# Patient Record
Sex: MALE | Race: WHITE | NOT HISPANIC OR LATINO | Employment: OTHER | URBAN - METROPOLITAN AREA
[De-identification: names, ages, dates, MRNs, and addresses within clinical notes are randomized per-mention and may not be internally consistent; named-entity substitution may affect disease eponyms.]

---

## 2023-07-31 LAB
ALBUMIN (G/DL) IN SER/PLAS: 3.8 G/DL (ref 3.4–5)
ANION GAP IN SER/PLAS: 11 MMOL/L (ref 10–20)
CALCIDIOL (25 OH VITAMIN D3) (NG/ML) IN SER/PLAS: 51 NG/ML
CALCIUM (MG/DL) IN SER/PLAS: 8.3 MG/DL (ref 8.6–10.3)
CARBON DIOXIDE, TOTAL (MMOL/L) IN SER/PLAS: 25 MMOL/L (ref 21–32)
CHLORIDE (MMOL/L) IN SER/PLAS: 108 MMOL/L (ref 98–107)
CREATININE (MG/DL) IN SER/PLAS: 1.51 MG/DL (ref 0.5–1.3)
CREATININE (MG/DL) IN URINE: 69.7 MG/DL (ref 20–370)
ERYTHROCYTE DISTRIBUTION WIDTH (RATIO) BY AUTOMATED COUNT: 13.2 % (ref 11.5–14.5)
ERYTHROCYTE MEAN CORPUSCULAR HEMOGLOBIN CONCENTRATION (G/DL) BY AUTOMATED: 33 G/DL (ref 32–36)
ERYTHROCYTE MEAN CORPUSCULAR VOLUME (FL) BY AUTOMATED COUNT: 91 FL (ref 80–100)
ERYTHROCYTES (10*6/UL) IN BLOOD BY AUTOMATED COUNT: 3.51 X10E12/L (ref 4.5–5.9)
GFR MALE: 42 ML/MIN/1.73M2
GLUCOSE (MG/DL) IN SER/PLAS: 93 MG/DL (ref 74–99)
HEMATOCRIT (%) IN BLOOD BY AUTOMATED COUNT: 32.1 % (ref 41–52)
HEMOGLOBIN (G/DL) IN BLOOD: 10.6 G/DL (ref 13.5–17.5)
LEUKOCYTES (10*3/UL) IN BLOOD BY AUTOMATED COUNT: 3.9 X10E9/L (ref 4.4–11.3)
PHOSPHATE (MG/DL) IN SER/PLAS: 3.3 MG/DL (ref 2.5–4.9)
PLATELETS (10*3/UL) IN BLOOD AUTOMATED COUNT: 100 X10E9/L (ref 150–450)
POTASSIUM (MMOL/L) IN SER/PLAS: 4.8 MMOL/L (ref 3.5–5.3)
PROTEIN (MG/DL) IN URINE: 12 MG/DL (ref 5–25)
PROTEIN/CREATININE (MG/MG) IN URINE: 0.17 MG/MG CREAT (ref 0–0.17)
SODIUM (MMOL/L) IN SER/PLAS: 139 MMOL/L (ref 136–145)
UREA NITROGEN (MG/DL) IN SER/PLAS: 40 MG/DL (ref 6–23)

## 2023-08-01 LAB — PARATHYRIN INTACT (PG/ML) IN SER/PLAS: 41 PG/ML (ref 18.5–88)

## 2024-03-25 ENCOUNTER — LAB (OUTPATIENT)
Dept: LAB | Facility: LAB | Age: 89
End: 2024-03-25
Payer: MEDICARE

## 2024-03-25 DIAGNOSIS — N18.31 CHRONIC KIDNEY DISEASE, STAGE 3A (MULTI): Primary | ICD-10-CM

## 2024-03-25 DIAGNOSIS — N18.31 CHRONIC KIDNEY DISEASE, STAGE 3A (MULTI): ICD-10-CM

## 2024-03-25 LAB
25(OH)D3 SERPL-MCNC: 55 NG/ML (ref 30–100)
ALBUMIN SERPL BCP-MCNC: 4.1 G/DL (ref 3.4–5)
ANION GAP SERPL CALC-SCNC: 10 MMOL/L (ref 10–20)
BUN SERPL-MCNC: 49 MG/DL (ref 6–23)
CALCIUM SERPL-MCNC: 8.4 MG/DL (ref 8.6–10.3)
CHLORIDE SERPL-SCNC: 108 MMOL/L (ref 98–107)
CO2 SERPL-SCNC: 26 MMOL/L (ref 21–32)
CREAT SERPL-MCNC: 1.74 MG/DL (ref 0.5–1.3)
CREAT UR-MCNC: 94.9 MG/DL (ref 20–370)
EGFRCR SERPLBLD CKD-EPI 2021: 35 ML/MIN/1.73M*2
ERYTHROCYTE [DISTWIDTH] IN BLOOD BY AUTOMATED COUNT: 13.1 % (ref 11.5–14.5)
GLUCOSE SERPL-MCNC: 108 MG/DL (ref 74–99)
HCT VFR BLD AUTO: 36.3 % (ref 41–52)
HGB BLD-MCNC: 11.9 G/DL (ref 13.5–17.5)
MCH RBC QN AUTO: 30.7 PG (ref 26–34)
MCHC RBC AUTO-ENTMCNC: 32.8 G/DL (ref 32–36)
MCV RBC AUTO: 94 FL (ref 80–100)
NRBC BLD-RTO: 0 /100 WBCS (ref 0–0)
PHOSPHATE SERPL-MCNC: 3.7 MG/DL (ref 2.5–4.9)
PLATELET # BLD AUTO: 119 X10*3/UL (ref 150–450)
POTASSIUM SERPL-SCNC: 4.9 MMOL/L (ref 3.5–5.3)
PROT UR-ACNC: 10 MG/DL (ref 5–25)
PROT/CREAT UR: 0.11 MG/MG CREAT (ref 0–0.17)
RBC # BLD AUTO: 3.88 X10*6/UL (ref 4.5–5.9)
SODIUM SERPL-SCNC: 139 MMOL/L (ref 136–145)
WBC # BLD AUTO: 4.7 X10*3/UL (ref 4.4–11.3)

## 2024-03-25 PROCEDURE — 80069 RENAL FUNCTION PANEL: CPT

## 2024-03-25 PROCEDURE — 82570 ASSAY OF URINE CREATININE: CPT

## 2024-03-25 PROCEDURE — 84156 ASSAY OF PROTEIN URINE: CPT

## 2024-03-25 PROCEDURE — 82306 VITAMIN D 25 HYDROXY: CPT

## 2024-03-25 PROCEDURE — 36415 COLL VENOUS BLD VENIPUNCTURE: CPT

## 2024-03-25 PROCEDURE — 85027 COMPLETE CBC AUTOMATED: CPT

## 2024-03-25 PROCEDURE — 83970 ASSAY OF PARATHORMONE: CPT

## 2024-03-26 LAB — PTH-INTACT SERPL-MCNC: 60.7 PG/ML (ref 18.5–88)

## 2024-07-09 ENCOUNTER — HOSPITAL ENCOUNTER (EMERGENCY)
Facility: HOSPITAL | Age: 89
Discharge: HOME | End: 2024-07-09
Payer: MEDICARE

## 2024-07-09 ENCOUNTER — APPOINTMENT (OUTPATIENT)
Dept: RADIOLOGY | Facility: HOSPITAL | Age: 89
End: 2024-07-09
Payer: MEDICARE

## 2024-07-09 VITALS
HEART RATE: 68 BPM | BODY MASS INDEX: 29.26 KG/M2 | HEIGHT: 71 IN | DIASTOLIC BLOOD PRESSURE: 60 MMHG | SYSTOLIC BLOOD PRESSURE: 153 MMHG | TEMPERATURE: 97.4 F | RESPIRATION RATE: 16 BRPM | WEIGHT: 209 LBS | OXYGEN SATURATION: 95 %
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
## 2024-07-09 DIAGNOSIS — S30.0XXA CONTUSION OF BUTTOCK, INITIAL ENCOUNTER: ICD-10-CM

## 2024-07-09 DIAGNOSIS — S51.811A SKIN TEAR OF RIGHT FOREARM WITHOUT COMPLICATION, INITIAL ENCOUNTER: ICD-10-CM

## 2024-07-09 PROCEDURE — 73090 X-RAY EXAM OF FOREARM: CPT | Mod: RIGHT SIDE | Performed by: RADIOLOGY

## 2024-07-09 PROCEDURE — 73090 X-RAY EXAM OF FOREARM: CPT | Mod: RT

## 2024-07-09 PROCEDURE — 12004 RPR S/N/AX/GEN/TRK7.6-12.5CM: CPT

## 2024-07-09 PROCEDURE — 90471 IMMUNIZATION ADMIN: CPT | Performed by: NURSE PRACTITIONER

## 2024-07-09 PROCEDURE — 2500000001 HC RX 250 WO HCPCS SELF ADMINISTERED DRUGS (ALT 637 FOR MEDICARE OP): Performed by: NURSE PRACTITIONER

## 2024-07-09 PROCEDURE — 2500000004 HC RX 250 GENERAL PHARMACY W/ HCPCS (ALT 636 FOR OP/ED): Performed by: NURSE PRACTITIONER

## 2024-07-09 PROCEDURE — 72170 X-RAY EXAM OF PELVIS: CPT

## 2024-07-09 PROCEDURE — 2500000005 HC RX 250 GENERAL PHARMACY W/O HCPCS: Performed by: NURSE PRACTITIONER

## 2024-07-09 PROCEDURE — 99284 EMERGENCY DEPT VISIT MOD MDM: CPT | Mod: 25

## 2024-07-09 PROCEDURE — 90715 TDAP VACCINE 7 YRS/> IM: CPT | Performed by: NURSE PRACTITIONER

## 2024-07-09 PROCEDURE — 72170 X-RAY EXAM OF PELVIS: CPT | Performed by: RADIOLOGY

## 2024-07-09 RX ORDER — BACITRACIN ZINC 500 UNIT/G
OINTMENT IN PACKET (EA) TOPICAL ONCE
Status: COMPLETED | OUTPATIENT
Start: 2024-07-09 | End: 2024-07-09

## 2024-07-09 RX ORDER — ACETAMINOPHEN 325 MG/1
975 TABLET ORAL ONCE
Status: COMPLETED | OUTPATIENT
Start: 2024-07-09 | End: 2024-07-09

## 2024-07-09 RX ORDER — CEPHALEXIN 500 MG/1
500 CAPSULE ORAL 3 TIMES DAILY
Qty: 21 CAPSULE | Refills: 0 | Status: SHIPPED | OUTPATIENT
Start: 2024-07-09 | End: 2024-07-16

## 2024-07-09 RX ORDER — LIDOCAINE HYDROCHLORIDE AND EPINEPHRINE 10; 10 MG/ML; UG/ML
5 INJECTION, SOLUTION INFILTRATION; PERINEURAL ONCE
Status: COMPLETED | OUTPATIENT
Start: 2024-07-09 | End: 2024-07-09

## 2024-07-09 ASSESSMENT — PAIN - FUNCTIONAL ASSESSMENT: PAIN_FUNCTIONAL_ASSESSMENT: 0-10

## 2024-07-09 ASSESSMENT — COLUMBIA-SUICIDE SEVERITY RATING SCALE - C-SSRS
1. IN THE PAST MONTH, HAVE YOU WISHED YOU WERE DEAD OR WISHED YOU COULD GO TO SLEEP AND NOT WAKE UP?: NO
6. HAVE YOU EVER DONE ANYTHING, STARTED TO DO ANYTHING, OR PREPARED TO DO ANYTHING TO END YOUR LIFE?: NO
2. HAVE YOU ACTUALLY HAD ANY THOUGHTS OF KILLING YOURSELF?: NO

## 2024-07-09 ASSESSMENT — PAIN DESCRIPTION - ORIENTATION: ORIENTATION: RIGHT;LOWER

## 2024-07-09 ASSESSMENT — PAIN DESCRIPTION - DESCRIPTORS: DESCRIPTORS: ACHING

## 2024-07-09 ASSESSMENT — LIFESTYLE VARIABLES
EVER FELT BAD OR GUILTY ABOUT YOUR DRINKING: NO
HAVE PEOPLE ANNOYED YOU BY CRITICIZING YOUR DRINKING: NO
HAVE YOU EVER FELT YOU SHOULD CUT DOWN ON YOUR DRINKING: NO
EVER HAD A DRINK FIRST THING IN THE MORNING TO STEADY YOUR NERVES TO GET RID OF A HANGOVER: NO
TOTAL SCORE: 0

## 2024-07-09 ASSESSMENT — PAIN DESCRIPTION - FREQUENCY: FREQUENCY: CONSTANT/CONTINUOUS

## 2024-07-09 ASSESSMENT — PAIN DESCRIPTION - LOCATION: LOCATION: ARM

## 2024-07-09 ASSESSMENT — PAIN SCALES - GENERAL: PAINLEVEL_OUTOF10: 3

## 2024-07-09 ASSESSMENT — PAIN DESCRIPTION - PAIN TYPE: TYPE: ACUTE PAIN

## 2024-07-10 NOTE — DISCHARGE INSTRUCTIONS
After 24 hours, take off the dressing and begin twice daily wound care. Clean twice daily with antibacterial soap and water. Pat dry. Use a small smear of antibiotic ointment Neosporin/Bacitracin or over the counter triple antibiotic cream. Should the wound start to look infected (red, swollen, painful, drainage), take the prescription for antibiotic pills to the pharmacy and start taking the medication. Then call your doctor or clinic as listed for a wound recheck.

## 2024-07-10 NOTE — ED PROVIDER NOTES
HPI   Chief Complaint   Patient presents with    Laceration     Fall->R forearm  skin tear       Patient presents to the emergency department in the care of his wife for evaluation of a right forearm injury that occurred prior to arrival.  Patient and his wife were walking into [employer] for dinner when his wife saw his foot catch on even ground causing him to stumble into a metal bar and then land onto his buttocks.  He remained alert and oriented for the entire incident.  She witnessed the entire incident as well and both confirm there was no head injury, loss of consciousness and there is no anticoagulant use.  Patient denies headache, neck pain, back pain, chest pain, shortness of breath, nausea, vomiting, abdominal pain or other extremity injury associated with his fall.  He came to the emergency department because of a large skin tear on his right forearm and he believes he needs a tetanus vaccine.  He notices some discomfort when he sits on his wallet as he drove he and his wife here to the emergency department.  There is been no loss of bowel or bladder control nor is there any numbness, weakness or inability to ambulate.  He has not noticed any limited range of motion of his fingers secondary to his injury and he is right-hand dominant.  He has not taken any Tylenol or ibuprofen for his discomfort prior to arrival.  His symptoms are aggravated by movement.  He denies any symptoms prior to his fall such as recent fever, URI, palpitations, dizziness, chest pain or shortness of breath.      History provided by:  Patient and spouse   used: No                        [place] Coma Scale Score: 15                     Patient History   Past Medical History:   Diagnosis Date    Hypertension      Past Surgical History:   Procedure Laterality Date    OTHER SURGICAL HISTORY  06/30/2020    Hernia repair    OTHER SURGICAL HISTORY  06/30/2020    Appendectomy     No family history on file.  Social History      Tobacco Use    Smoking status: Not on file    Smokeless tobacco: Not on file   Substance Use Topics    Alcohol use: Not on file    Drug use: Not on file       Physical Exam   ED Triage Vitals [07/09/24 1843]   Temperature Heart Rate Respirations BP   36.3 °C (97.4 °F) 68 16 153/60      Pulse Ox Temp Source Heart Rate Source Patient Position   95 % Temporal -- --      BP Location FiO2 (%)     -- --       Physical Exam  Vitals and nursing note reviewed.   Constitutional:       General: He is not in acute distress.     Appearance: He is not toxic-appearing.   HENT:      Head: Normocephalic and atraumatic. No raccoon eyes or Zuleta's sign.      Comments: No outward sign of trauma.     Right Ear: Decreased hearing noted.      Left Ear: Decreased hearing noted.      Nose: Nose normal.      Mouth/Throat:      Lips: Pink.      Mouth: Mucous membranes are moist.   Eyes:      Conjunctiva/sclera: Conjunctivae normal.      Pupils: Pupils are equal, round, and reactive to light.   Neck:      Vascular: No JVD.   Cardiovascular:      Rate and Rhythm: Normal rate and regular rhythm.      Pulses:           Radial pulses are 2+ on the right side.   Pulmonary:      Effort: Pulmonary effort is normal.      Breath sounds: Normal breath sounds. No wheezing or rhonchi.   Chest:      Chest wall: No tenderness or crepitus.      Comments: No outward sign of trauma  Abdominal:      Palpations: Abdomen is soft.      Tenderness: There is no abdominal tenderness.   Musculoskeletal:         General: No swelling.      Cervical back: Full passive range of motion without pain and neck supple. No spinous process tenderness.      Right lower leg: Edema present.      Left lower leg: Edema present.      Comments: NIA corral.  There is no bony tenderness to the cervical, thoracic or lumbar spine.  No pain upon palpation of the pelvis.  There is some discomfort to palpation of the left ischium.  No pain upon palpation of the bilateral lower  extremities and patient is able to ambulate with out an assistive device.  There is no bony tenderness to palpation of the left upper extremity.  There is soft tissue tenderness to the right forearm with a large skin tear with bleeding controlled and no heavy debris.  No obvious tendon injury.  Full range of motion and no bony tenderness at the elbow and wrist. Strong right radial and ulnar pulses.  Normal capillary refill.  Full sensation and motor movement intact along the radial, median and ulnar nerve distribution. Neurovascularly intact with compartments soft.    Skin:     General: Skin is warm and dry.      Capillary Refill: Capillary refill takes less than 2 seconds.      Findings: Wound (skin tear right forearm) present.   Neurological:      General: No focal deficit present.      Mental Status: He is alert and oriented to person, place, and time.      Gait: Gait is intact.      Comments: Clear speech.  No facial asymmetry.  Hand grasps, pushes, pulls, dorsiflexion and plantarflexion strong and equal bilaterally.   Psychiatric:         Mood and Affect: Mood normal.         Behavior: Behavior is cooperative.         ED Course & MDM   ED Course as of 07/09/24 2313   Tue Jul 09, 2024 2117 X-rays discussed. Plan is for wound care twice daily as discussed, topical antibiotic cream, watchful waiting for starting cephalexin as discussed otherwise outpatient follow-up for suture removal in 7 days with primary care. Patient is non-toxic, not hypoxic and appropriate for this outpatient management plan which they prefer. Encouragement to arrange close follow up was discussed as well as provided in a written handout of discharge instructions. Patient was educated on signs of symptoms to watch for indicative of re-evaluation in the emergency department setting to include any worsening of current symptoms. They verbalized understanding of instructions and is amenable to this treatment plan with no social determinants of  health that would obscure this plan. Patient departed in stable condition.  [NA]      ED Course User Index  [NA] RADHA Mishra         Diagnoses as of 07/09/24 2313   Fall, initial encounter   Skin tear of right forearm without complication, initial encounter   Contusion of buttock, initial encounter       Medical Decision Making  Patient presents for evaluation of a wound. Tetanus vaccination is not up to date. Bleeding is controlled and patient is not on anticoagulation. There is clinical evidence warranting diagnostic imaging.  Plan is for x-ray imaging, Tylenol, tetanus booster and wound repair as documented below.  Patient provides consent for this management plan.        Procedure  TIME: 2045    LACERATION REPAIR - SUTURES    Indication: large skin tear with good approximation  Performed By: Brielle Salmeron CNP  Risks, benefits and alternatives for the applicable procedure described. Opportunity for questions and answers provided to allow for informed decision making.  Consent: Verbal consent was obtained by the patient    LOCATION: dorsal ulnar aspect right mid forearm  LENGTH: 8.5 cm  THICKNESS: Partial     Anesthesia was obtained by direct infiltration with 1% lidocaine with epinephrine. The laceration was treated with irrigation with saline solution under pressure. The area was prepped and draped in the usual sterile fashion. The wound was explored with No foreign bodies found, No tendon laceration seen. The site was then repaired with  16 , 5-0, Ethilon interrupted skin closure sutures with good approximation. No debridement or undermining was required. A bacitracin dressing was placed over the site. Patient was neurovascularly intact before and after the procedure. They tolerated the procedure well without complications. Wound care, dressing changes, suture removal and return precautions discussed with understanding verbalized.       RADHA Mishra  07/09/24 2313

## 2024-09-06 ENCOUNTER — HOSPITAL ENCOUNTER (EMERGENCY)
Facility: HOSPITAL | Age: 89
Discharge: HOME | End: 2024-09-06
Payer: MEDICARE

## 2024-09-06 VITALS
SYSTOLIC BLOOD PRESSURE: 108 MMHG | HEART RATE: 86 BPM | WEIGHT: 203 LBS | HEIGHT: 71 IN | OXYGEN SATURATION: 94 % | TEMPERATURE: 97.9 F | BODY MASS INDEX: 28.42 KG/M2 | DIASTOLIC BLOOD PRESSURE: 48 MMHG | RESPIRATION RATE: 16 BRPM

## 2024-09-06 DIAGNOSIS — L03.115 CELLULITIS OF RIGHT LOWER EXTREMITY: Primary | ICD-10-CM

## 2024-09-06 PROCEDURE — 99283 EMERGENCY DEPT VISIT LOW MDM: CPT

## 2024-09-06 PROCEDURE — 2500000001 HC RX 250 WO HCPCS SELF ADMINISTERED DRUGS (ALT 637 FOR MEDICARE OP)

## 2024-09-06 RX ORDER — CEPHALEXIN 500 MG/1
500 CAPSULE ORAL 4 TIMES DAILY
Qty: 40 CAPSULE | Refills: 0 | Status: SHIPPED | OUTPATIENT
Start: 2024-09-06 | End: 2024-09-16

## 2024-09-06 RX ORDER — MUPIROCIN 20 MG/G
1 OINTMENT TOPICAL
Qty: 30 G | Refills: 1 | Status: SHIPPED | OUTPATIENT
Start: 2024-09-06

## 2024-09-06 RX ORDER — MUPIROCIN 20 MG/G
OINTMENT TOPICAL ONCE
Status: COMPLETED | OUTPATIENT
Start: 2024-09-06 | End: 2024-09-06

## 2024-09-06 RX ORDER — CEPHALEXIN 250 MG/1
500 CAPSULE ORAL ONCE
Status: COMPLETED | OUTPATIENT
Start: 2024-09-06 | End: 2024-09-06

## 2024-09-06 ASSESSMENT — PAIN - FUNCTIONAL ASSESSMENT: PAIN_FUNCTIONAL_ASSESSMENT: 0-10

## 2024-09-06 ASSESSMENT — PAIN SCALES - GENERAL: PAINLEVEL_OUTOF10: 4

## 2024-09-06 ASSESSMENT — COLUMBIA-SUICIDE SEVERITY RATING SCALE - C-SSRS
1. IN THE PAST MONTH, HAVE YOU WISHED YOU WERE DEAD OR WISHED YOU COULD GO TO SLEEP AND NOT WAKE UP?: NO
2. HAVE YOU ACTUALLY HAD ANY THOUGHTS OF KILLING YOURSELF?: NO
6. HAVE YOU EVER DONE ANYTHING, STARTED TO DO ANYTHING, OR PREPARED TO DO ANYTHING TO END YOUR LIFE?: NO

## 2024-09-06 ASSESSMENT — PAIN DESCRIPTION - ORIENTATION: ORIENTATION: RIGHT

## 2024-09-06 NOTE — ED PROVIDER NOTES
Chief Complaint   Patient presents with    Wound Infection     Right leg behind knee wound with bleeding and drainage        89-year-old male arrives to the emergency department with a chief complaint of skin breakdown, redness, swelling, warmth on the right popliteal area.  The patient recently had a cellulitic infection on the left cheek, approximately 3 weeks ago was placed on oral antibiotic therapy, the skin infection on the face has subsided, and the patient noticed a small spot on the back of his right knee.  This spot has grown, on initial assessment there is a circular area of skin breakdown approximately 6 cm in diameter, with the edges slightly erythemic, no significant warmth or redness appreciated.  Patient denies fevers or chills, patient hemodynamically stable upon arrival.  Patient is high functioning for age, ambulates with assistance at baseline, and states that he feels well outside of the symptoms on the right posterior leg.  Patient arrives with the wound dressed, and clean.  Patient denies any other symptoms or complaints           PmHx, PsHx, Allergies, Family Hx, social Hx reviewed as documented    A complete 10 point review of systems was performed and is negative except for as mentioned in the HPI.    Physical Exam:    General: Patient is AAOx3, appears well developed, well nourished, is a good historian, answers questions appropriately    HEENT: head normocephalic, atraumatic, PERRLA, EOMs intact, oropharynx without erythema or exudate, buccal mucosa intact without lesions, TMs unremarkable, nose is patent bilateral    Neck: supple, full ROM, negative for lymphadenopathy, JVD, thyromegaly, tracheal deviation, nuccal rigidity    Pulmonary: CTAB, no accessory muscle use, able to speak full clear sentences    Cardiac: HRRR, no murmurs, rubs or gallops    GI: soft, non-tender, non-distended, BS + x 4, no masses or organomegaly, no guarding or CVA tenderness noted, negative ortiz's,  bakariey's    Musculoskeletal: full weight bearing, KRAMER, no joint effusions, clubbing or edema noted    Skin: Area of skin breakdown in the right popliteal per HPI, please see attached photo.  Patient has multiple areas of what appear to be chronic skin breakdown likely secondary to eczema    Neuro: patient follow commands, cranial nerves 2-12 grossly intact, motor strengths 5/5 upper and lower extremities, DTR's and sensation are symmetrical. No focal deficits.    Rectal/: No urinary burning, urgency, change in frequency.  Patient has no rectal complaints            Medical Decision Making  This patient was seen in the emergency department with an attending physician available at all times throughout their ED course    Primary consideration for this patient would be treatment of the cellulitic infection, however the infectious process does seem to be mild, with the skin breakdown appearing chronic with no systemic component appreciated.  Through shared decision making with the patient as well as family at bedside, no diagnostic blood work will be done at this time given the patient does feel at his baseline.  There is no purulent drainage, redness, warmth, or signs of severe infection at the area.  Patient does have a wound care appointment established for 9/10.    The primary nurse cleansed the area with Dermaclens, applied the ordered Bactroban, covered with a nonadherent dressing and wrapped in Kerlix.  The patient as well as the family members verbalized understanding of wound care at home.  Patient also started on 500 mg of Keflex 4 times a day for 10 days given his first dose here in the emergency department.  Patient also given an outpatient prescription of Bactroban to be used and lieu of Neosporin.    Patient is amenable to the plan of discharge as outlined above, all patient's questions pertaining to their ED course were answered in their entirety.  Strict return precautions were discussed with the  "patient and they verbalized understanding.  Further, it was made clear to the patient that from an emergent basis, all effort and testing was done to eliminate any imminent dangerous or potentially dangerous conditions of the patient however if their symptoms get much worse or feel life-threatening, they are to return to the emergency department or call 911 immediately.       Diagnoses as of 09/06/24 2019   Cellulitis of right lower extremity       The patient has had the following imaging during this ER visit: NURSING COMMUNICATION - DO NOT USE IN ORDER SETS     Patient History   Past Medical History:   Diagnosis Date    Hypertension      Past Surgical History:   Procedure Laterality Date    OTHER SURGICAL HISTORY  06/30/2020    Hernia repair    OTHER SURGICAL HISTORY  06/30/2020    Appendectomy     No family history on file.  Social History     Tobacco Use    Smoking status: Not on file    Smokeless tobacco: Not on file   Substance Use Topics    Alcohol use: Not on file    Drug use: Not on file       ED Triage Vitals [09/06/24 1307]   Temperature Heart Rate Respirations BP   36.6 °C (97.9 °F) 86 16 (!) 108/48      Pulse Ox Temp Source Heart Rate Source Patient Position   94 % Temporal -- Sitting      BP Location FiO2 (%)     Left arm --       Vitals:    09/06/24 1307   BP: (!) 108/48   BP Location: Left arm   Patient Position: Sitting   Pulse: 86   Resp: 16   Temp: 36.6 °C (97.9 °F)   TempSrc: Temporal   SpO2: 94%   Weight: 92.1 kg (203 lb)   Height: 1.803 m (5' 11\")               SYLVIE Dalal-CNP  09/06/24 2020    "

## 2024-09-06 NOTE — DISCHARGE INSTRUCTIONS
As discussed, please use Telfa/nonadherent gauze pads to cover the area and rolled gauze such as Kerlix and lieu of tape to prevent further skin breakdown.  Please maintain appointment with wound care and ensure that there is a weekly wound care regimen in place until wound is completely healed

## 2025-01-20 ENCOUNTER — HOSPITAL ENCOUNTER (EMERGENCY)
Facility: HOSPITAL | Age: OVER 89
Discharge: HOME | End: 2025-01-20
Payer: MEDICARE

## 2025-01-20 VITALS
OXYGEN SATURATION: 97 % | WEIGHT: 204 LBS | HEIGHT: 71 IN | DIASTOLIC BLOOD PRESSURE: 63 MMHG | TEMPERATURE: 97 F | SYSTOLIC BLOOD PRESSURE: 186 MMHG | BODY MASS INDEX: 28.56 KG/M2 | RESPIRATION RATE: 16 BRPM | HEART RATE: 69 BPM

## 2025-01-20 DIAGNOSIS — S51.811A SKIN TEAR OF FOREARM WITHOUT COMPLICATION, RIGHT, INITIAL ENCOUNTER: Primary | ICD-10-CM

## 2025-01-20 PROCEDURE — 99282 EMERGENCY DEPT VISIT SF MDM: CPT

## 2025-01-20 PROCEDURE — 99281 EMR DPT VST MAYX REQ PHY/QHP: CPT

## 2025-01-20 ASSESSMENT — PAIN SCALES - GENERAL: PAINLEVEL_OUTOF10: 4

## 2025-01-20 ASSESSMENT — LIFESTYLE VARIABLES
EVER HAD A DRINK FIRST THING IN THE MORNING TO STEADY YOUR NERVES TO GET RID OF A HANGOVER: NO
TOTAL SCORE: 0
HAVE PEOPLE ANNOYED YOU BY CRITICIZING YOUR DRINKING: NO
HAVE YOU EVER FELT YOU SHOULD CUT DOWN ON YOUR DRINKING: NO
EVER FELT BAD OR GUILTY ABOUT YOUR DRINKING: NO

## 2025-01-20 ASSESSMENT — PAIN DESCRIPTION - ORIENTATION: ORIENTATION: RIGHT

## 2025-01-20 ASSESSMENT — PAIN DESCRIPTION - LOCATION: LOCATION: ARM

## 2025-01-20 ASSESSMENT — PAIN - FUNCTIONAL ASSESSMENT: PAIN_FUNCTIONAL_ASSESSMENT: 0-10

## 2025-01-20 NOTE — ED PROVIDER NOTES
HPI   Chief Complaint   Patient presents with    Laceration     Woke up with a small lac to r arm not sure how       This is a 89-year-old  male presenting to the emergency room with complaints of a left forearm wound.  The patient rolled over in bed this morning and noted that his arm was bleeding.  The patient has a skin tear to the forearm.  States that it happens frequently due to the thinness of his skin.  Patient is uncertain if his tetanus is up-to-date.  He is experiencing 4 out of 10 pain to the extremity.  Denies any loss of motor function or sensation to the extremity.  He is right-hand dominant.      History provided by:  Patient   used: No            Patient History   Past Medical History:   Diagnosis Date    Hypertension      Past Surgical History:   Procedure Laterality Date    OTHER SURGICAL HISTORY  06/30/2020    Hernia repair    OTHER SURGICAL HISTORY  06/30/2020    Appendectomy     No family history on file.  Social History     Tobacco Use    Smoking status: Not on file    Smokeless tobacco: Not on file   Substance Use Topics    Alcohol use: Not on file    Drug use: Not on file       Physical Exam   ED Triage Vitals [01/20/25 0817]   Temperature Heart Rate Respirations BP   36.1 °C (97 °F) 69 16 (!) 186/63      Pulse Ox Temp src Heart Rate Source Patient Position   97 % -- -- --      BP Location FiO2 (%)     -- --       Physical Exam  Vitals and nursing note reviewed.   HENT:      Head: Normocephalic and atraumatic.   Cardiovascular:      Rate and Rhythm: Normal rate and regular rhythm.      Pulses: Normal pulses.      Heart sounds: Normal heart sounds.   Pulmonary:      Effort: Pulmonary effort is normal.      Breath sounds: Normal breath sounds.   Musculoskeletal:         General: Normal range of motion.   Skin:     General: Skin is warm.      Capillary Refill: Capillary refill takes less than 2 seconds.      Comments: Patient has a 2 cm in length skin tear noted to  the dorsal aspect of the right forearm.  No active bleeding.  No obvious foreign bodies.  No bony involvement.   Neurological:      General: No focal deficit present.      Mental Status: He is alert.   Psychiatric:         Mood and Affect: Mood normal.           ED Course & MDM   Diagnoses as of 01/20/25 0849   Skin tear of forearm without complication, right, initial encounter                 No data recorded     [place] Coma Scale Score: 15 (01/20/25 0815 : Pau Good RN)                           Medical Decision Making  The patient was seen and evaluated by the nurse practitioner, Divina Douglas.  The patient is presenting to the emergency room with complaints of a skin tear to the right forearm.  The wound was examined.  There is very superficial in nature.  A suture repair is not warranted.  The patient was covered with sterile drape.  The wound was cleaned with Shur-Clens and irrigated with sterile saline.  The wound base was visualized.  No bony involvement.  No tendon injury noted full range of motion of the extremity.  The wound edges were approximated and secured with multiple Steri-Strips.  Nonadherent dressing was applied.  The patient's tetanus was updated last year.  He was discharged in stable condition with computer discharge instructions given.  The patient was educated on wound care and signs and symptoms of infection.  He is to follow-up with his primary care physician in the next 2 to 3 days.        Procedure  Procedures     SYLVIE Montoya-CNP  01/20/25 0854

## 2025-02-12 ENCOUNTER — APPOINTMENT (OUTPATIENT)
Dept: RADIOLOGY | Facility: HOSPITAL | Age: OVER 89
DRG: 185 | End: 2025-02-12
Payer: MEDICARE

## 2025-02-12 ENCOUNTER — APPOINTMENT (OUTPATIENT)
Dept: CARDIOLOGY | Facility: HOSPITAL | Age: OVER 89
DRG: 185 | End: 2025-02-12
Payer: MEDICARE

## 2025-02-12 ENCOUNTER — HOSPITAL ENCOUNTER (INPATIENT)
Facility: HOSPITAL | Age: OVER 89
LOS: 3 days | Discharge: NURSING, REHAB OR LONG-TERM CARE | DRG: 185 | End: 2025-02-15
Attending: EMERGENCY MEDICINE | Admitting: INTERNAL MEDICINE
Payer: MEDICARE
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
## 2025-02-12 DIAGNOSIS — S22.41XA MULTIPLE FRACTURES OF RIBS, RIGHT SIDE, INITIAL ENCOUNTER FOR CLOSED FRACTURE: ICD-10-CM

## 2025-02-12 PROBLEM — R00.1 BRADYCARDIA: Status: ACTIVE | Noted: 2023-01-30

## 2025-02-12 PROBLEM — E53.8 VITAMIN B12 DEFICIENCY: Status: ACTIVE | Noted: 2025-02-12

## 2025-02-12 PROBLEM — I12.9 HYPERTENSIVE KIDNEY DISEASE WITH STAGE 3B CHRONIC KIDNEY DISEASE (MULTI): Status: ACTIVE | Noted: 2023-02-07

## 2025-02-12 PROBLEM — H91.93 DECREASED HEARING OF BOTH EARS: Status: ACTIVE | Noted: 2025-02-12

## 2025-02-12 PROBLEM — I10 ESSENTIAL HYPERTENSION: Status: ACTIVE | Noted: 2021-01-05

## 2025-02-12 PROBLEM — G89.29 CHRONIC PAIN OF RIGHT KNEE: Status: ACTIVE | Noted: 2022-07-08

## 2025-02-12 PROBLEM — E55.9 VITAMIN D DEFICIENCY: Status: ACTIVE | Noted: 2022-10-24

## 2025-02-12 PROBLEM — N18.32 HYPERTENSIVE KIDNEY DISEASE WITH STAGE 3B CHRONIC KIDNEY DISEASE (MULTI): Status: ACTIVE | Noted: 2023-02-07

## 2025-02-12 PROBLEM — E78.2 MIXED HYPERLIPIDEMIA: Status: ACTIVE | Noted: 2025-01-07

## 2025-02-12 PROBLEM — H25.091 AGE-RELATED INCIPIENT CATARACT OF RIGHT EYE: Status: ACTIVE | Noted: 2025-02-12

## 2025-02-12 PROBLEM — I49.1 PREMATURE ATRIAL CONTRACTIONS: Status: ACTIVE | Noted: 2025-02-12

## 2025-02-12 PROBLEM — N18.32 STAGE 3B CHRONIC KIDNEY DISEASE (MULTI): Status: ACTIVE | Noted: 2022-01-08

## 2025-02-12 PROBLEM — F33.41 RECURRENT MAJOR DEPRESSIVE DISORDER, IN PARTIAL REMISSION (CMS-HCC): Status: ACTIVE | Noted: 2025-01-07

## 2025-02-12 PROBLEM — G47.33 OBSTRUCTIVE SLEEP APNEA: Status: ACTIVE | Noted: 2021-07-06

## 2025-02-12 PROBLEM — M79.89 BILATERAL SWELLING OF FEET: Status: ACTIVE | Noted: 2021-07-06

## 2025-02-12 PROBLEM — M25.561 CHRONIC PAIN OF RIGHT KNEE: Status: ACTIVE | Noted: 2022-07-08

## 2025-02-12 PROBLEM — M17.11 LOCALIZED OSTEOARTHRITIS OF RIGHT KNEE: Status: ACTIVE | Noted: 2025-01-07

## 2025-02-12 PROBLEM — R73.03 PREDIABETES: Status: ACTIVE | Noted: 2022-10-24

## 2025-02-12 PROBLEM — I45.10 RIGHT BUNDLE BRANCH BLOCK (RBBB) ON ELECTROCARDIOGRAPHY: Status: ACTIVE | Noted: 2025-02-12

## 2025-02-12 PROBLEM — D69.6 THROMBOCYTOPENIA (CMS-HCC): Status: ACTIVE | Noted: 2022-01-08

## 2025-02-12 LAB
ABO GROUP (TYPE) IN BLOOD: NORMAL
ALBUMIN SERPL BCP-MCNC: 3.6 G/DL (ref 3.4–5)
ALP SERPL-CCNC: 67 U/L (ref 33–136)
ALT SERPL W P-5'-P-CCNC: 11 U/L (ref 10–52)
ANION GAP SERPL CALC-SCNC: 10 MMOL/L (ref 10–20)
ANTIBODY SCREEN: NORMAL
APTT PPP: 27 SECONDS (ref 27–38)
AST SERPL W P-5'-P-CCNC: 14 U/L (ref 9–39)
BASOPHILS # BLD AUTO: 0.02 X10*3/UL (ref 0–0.1)
BASOPHILS NFR BLD AUTO: 0.5 %
BILIRUB SERPL-MCNC: 0.8 MG/DL (ref 0–1.2)
BUN SERPL-MCNC: 36 MG/DL (ref 6–23)
CALCIUM SERPL-MCNC: 8.5 MG/DL (ref 8.6–10.3)
CHLORIDE SERPL-SCNC: 108 MMOL/L (ref 98–107)
CO2 SERPL-SCNC: 23 MMOL/L (ref 21–32)
CREAT SERPL-MCNC: 1.73 MG/DL (ref 0.5–1.3)
EGFRCR SERPLBLD CKD-EPI 2021: 35 ML/MIN/1.73M*2
EOSINOPHIL # BLD AUTO: 0 X10*3/UL (ref 0–0.4)
EOSINOPHIL NFR BLD AUTO: 0 %
ERYTHROCYTE [DISTWIDTH] IN BLOOD BY AUTOMATED COUNT: 14.2 % (ref 11.5–14.5)
FLUAV RNA RESP QL NAA+PROBE: NOT DETECTED
FLUBV RNA RESP QL NAA+PROBE: NOT DETECTED
GLUCOSE SERPL-MCNC: 121 MG/DL (ref 74–99)
HCT VFR BLD AUTO: 30.1 % (ref 41–52)
HGB BLD-MCNC: 10.1 G/DL (ref 13.5–17.5)
HYPOCHROMIA BLD QL SMEAR: NORMAL
IMM GRANULOCYTES # BLD AUTO: 0.02 X10*3/UL (ref 0–0.5)
IMM GRANULOCYTES NFR BLD AUTO: 0.5 % (ref 0–0.9)
INR PPP: 1.2 (ref 0.9–1.1)
LYMPHOCYTES # BLD AUTO: 0.73 X10*3/UL (ref 0.8–3)
LYMPHOCYTES NFR BLD AUTO: 16.9 %
MCH RBC QN AUTO: 29.9 PG (ref 26–34)
MCHC RBC AUTO-ENTMCNC: 33.6 G/DL (ref 32–36)
MCV RBC AUTO: 89 FL (ref 80–100)
MONOCYTES # BLD AUTO: 0.48 X10*3/UL (ref 0.05–0.8)
MONOCYTES NFR BLD AUTO: 11.1 %
NEUTROPHILS # BLD AUTO: 3.08 X10*3/UL (ref 1.6–5.5)
NEUTROPHILS NFR BLD AUTO: 71 %
NRBC BLD-RTO: 0 /100 WBCS (ref 0–0)
OVALOCYTES BLD QL SMEAR: NORMAL
PLATELET # BLD AUTO: 92 X10*3/UL (ref 150–450)
POTASSIUM SERPL-SCNC: 4.1 MMOL/L (ref 3.5–5.3)
PROT SERPL-MCNC: 6.4 G/DL (ref 6.4–8.2)
PROTHROMBIN TIME: 13.2 SECONDS (ref 9.8–12.8)
RBC # BLD AUTO: 3.38 X10*6/UL (ref 4.5–5.9)
RBC MORPH BLD: NORMAL
RH FACTOR (ANTIGEN D): NORMAL
SARS-COV-2 RNA RESP QL NAA+PROBE: NOT DETECTED
SODIUM SERPL-SCNC: 137 MMOL/L (ref 136–145)
WBC # BLD AUTO: 4.3 X10*3/UL (ref 4.4–11.3)

## 2025-02-12 PROCEDURE — 2500000005 HC RX 250 GENERAL PHARMACY W/O HCPCS: Performed by: EMERGENCY MEDICINE

## 2025-02-12 PROCEDURE — 99223 1ST HOSP IP/OBS HIGH 75: CPT

## 2025-02-12 PROCEDURE — 86901 BLOOD TYPING SEROLOGIC RH(D): CPT | Performed by: EMERGENCY MEDICINE

## 2025-02-12 PROCEDURE — 2500000004 HC RX 250 GENERAL PHARMACY W/ HCPCS (ALT 636 FOR OP/ED)

## 2025-02-12 PROCEDURE — 2060000001 HC INTERMEDIATE ICU ROOM DAILY

## 2025-02-12 PROCEDURE — 74176 CT ABD & PELVIS W/O CONTRAST: CPT | Mod: RSC | Performed by: RADIOLOGY

## 2025-02-12 PROCEDURE — 72131 CT LUMBAR SPINE W/O DYE: CPT | Mod: RSC | Performed by: RADIOLOGY

## 2025-02-12 PROCEDURE — 36415 COLL VENOUS BLD VENIPUNCTURE: CPT | Performed by: EMERGENCY MEDICINE

## 2025-02-12 PROCEDURE — 2500000001 HC RX 250 WO HCPCS SELF ADMINISTERED DRUGS (ALT 637 FOR MEDICARE OP): Performed by: EMERGENCY MEDICINE

## 2025-02-12 PROCEDURE — 96372 THER/PROPH/DIAG INJ SC/IM: CPT

## 2025-02-12 PROCEDURE — 96374 THER/PROPH/DIAG INJ IV PUSH: CPT | Mod: 59

## 2025-02-12 PROCEDURE — 2500000004 HC RX 250 GENERAL PHARMACY W/ HCPCS (ALT 636 FOR OP/ED): Performed by: EMERGENCY MEDICINE

## 2025-02-12 PROCEDURE — 99222 1ST HOSP IP/OBS MODERATE 55: CPT | Performed by: SURGERY

## 2025-02-12 PROCEDURE — 99285 EMERGENCY DEPT VISIT HI MDM: CPT | Mod: 25 | Performed by: EMERGENCY MEDICINE

## 2025-02-12 PROCEDURE — 71250 CT THORAX DX C-: CPT | Mod: RSC | Performed by: RADIOLOGY

## 2025-02-12 PROCEDURE — 93005 ELECTROCARDIOGRAM TRACING: CPT

## 2025-02-12 PROCEDURE — 72131 CT LUMBAR SPINE W/O DYE: CPT | Mod: RSC

## 2025-02-12 PROCEDURE — 72128 CT CHEST SPINE W/O DYE: CPT | Mod: RSC

## 2025-02-12 PROCEDURE — 2500000001 HC RX 250 WO HCPCS SELF ADMINISTERED DRUGS (ALT 637 FOR MEDICARE OP)

## 2025-02-12 PROCEDURE — G0390 TRAUMA RESPONS W/HOSP CRITI: HCPCS

## 2025-02-12 PROCEDURE — 71250 CT THORAX DX C-: CPT

## 2025-02-12 PROCEDURE — 85025 COMPLETE CBC W/AUTO DIFF WBC: CPT | Performed by: EMERGENCY MEDICINE

## 2025-02-12 PROCEDURE — 2500000001 HC RX 250 WO HCPCS SELF ADMINISTERED DRUGS (ALT 637 FOR MEDICARE OP): Performed by: SURGERY

## 2025-02-12 PROCEDURE — 80053 COMPREHEN METABOLIC PANEL: CPT | Performed by: EMERGENCY MEDICINE

## 2025-02-12 PROCEDURE — 96375 TX/PRO/DX INJ NEW DRUG ADDON: CPT

## 2025-02-12 PROCEDURE — 2500000002 HC RX 250 W HCPCS SELF ADMINISTERED DRUGS (ALT 637 FOR MEDICARE OP, ALT 636 FOR OP/ED)

## 2025-02-12 PROCEDURE — 72128 CT CHEST SPINE W/O DYE: CPT | Mod: RSC | Performed by: RADIOLOGY

## 2025-02-12 PROCEDURE — 85610 PROTHROMBIN TIME: CPT | Performed by: EMERGENCY MEDICINE

## 2025-02-12 PROCEDURE — 87636 SARSCOV2 & INF A&B AMP PRB: CPT | Performed by: EMERGENCY MEDICINE

## 2025-02-12 RX ORDER — ONDANSETRON HYDROCHLORIDE 2 MG/ML
4 INJECTION, SOLUTION INTRAVENOUS EVERY 6 HOURS PRN
Status: DISCONTINUED | OUTPATIENT
Start: 2025-02-12 | End: 2025-02-15 | Stop reason: HOSPADM

## 2025-02-12 RX ORDER — ACETAMINOPHEN 325 MG/1
650 TABLET ORAL EVERY 6 HOURS
Status: DISCONTINUED | OUTPATIENT
Start: 2025-02-12 | End: 2025-02-15 | Stop reason: HOSPADM

## 2025-02-12 RX ORDER — LOSARTAN POTASSIUM 100 MG/1
100 TABLET ORAL DAILY
COMMUNITY

## 2025-02-12 RX ORDER — DICYCLOMINE HYDROCHLORIDE 10 MG/1
20 CAPSULE ORAL
Status: DISCONTINUED | OUTPATIENT
Start: 2025-02-12 | End: 2025-02-12

## 2025-02-12 RX ORDER — MORPHINE SULFATE 2 MG/ML
2 INJECTION, SOLUTION INTRAMUSCULAR; INTRAVENOUS EVERY 4 HOURS PRN
Status: DISCONTINUED | OUTPATIENT
Start: 2025-02-12 | End: 2025-02-13

## 2025-02-12 RX ORDER — MORPHINE SULFATE 4 MG/ML
4 INJECTION INTRAVENOUS EVERY 4 HOURS PRN
Status: DISCONTINUED | OUTPATIENT
Start: 2025-02-12 | End: 2025-02-12

## 2025-02-12 RX ORDER — TRIAMTERENE/HYDROCHLOROTHIAZID 37.5-25 MG
1 TABLET ORAL
COMMUNITY
Start: 2020-02-17

## 2025-02-12 RX ORDER — TALC
3 POWDER (GRAM) TOPICAL NIGHTLY PRN
Status: DISCONTINUED | OUTPATIENT
Start: 2025-02-12 | End: 2025-02-15 | Stop reason: HOSPADM

## 2025-02-12 RX ORDER — PANTOPRAZOLE SODIUM 40 MG/10ML
40 INJECTION, POWDER, LYOPHILIZED, FOR SOLUTION INTRAVENOUS
Status: DISCONTINUED | OUTPATIENT
Start: 2025-02-13 | End: 2025-02-15 | Stop reason: HOSPADM

## 2025-02-12 RX ORDER — SERTRALINE HYDROCHLORIDE 100 MG/1
100 TABLET, FILM COATED ORAL DAILY
Status: DISCONTINUED | OUTPATIENT
Start: 2025-02-12 | End: 2025-02-15 | Stop reason: HOSPADM

## 2025-02-12 RX ORDER — MORPHINE SULFATE 2 MG/ML
2 INJECTION, SOLUTION INTRAMUSCULAR; INTRAVENOUS ONCE
Status: COMPLETED | OUTPATIENT
Start: 2025-02-12 | End: 2025-02-12

## 2025-02-12 RX ORDER — TRIAMTERENE/HYDROCHLOROTHIAZID 37.5-25 MG
1 TABLET ORAL DAILY
Status: DISCONTINUED | OUTPATIENT
Start: 2025-02-12 | End: 2025-02-15 | Stop reason: HOSPADM

## 2025-02-12 RX ORDER — LIDOCAINE 560 MG/1
1 PATCH PERCUTANEOUS; TOPICAL; TRANSDERMAL DAILY
Status: DISCONTINUED | OUTPATIENT
Start: 2025-02-12 | End: 2025-02-15 | Stop reason: HOSPADM

## 2025-02-12 RX ORDER — POLYETHYLENE GLYCOL 3350 17 G/17G
17 POWDER, FOR SOLUTION ORAL DAILY PRN
Status: DISCONTINUED | OUTPATIENT
Start: 2025-02-12 | End: 2025-02-15 | Stop reason: HOSPADM

## 2025-02-12 RX ORDER — DICYCLOMINE HYDROCHLORIDE 20 MG/1
20 TABLET ORAL
COMMUNITY
Start: 2023-12-11

## 2025-02-12 RX ORDER — METHOCARBAMOL 500 MG/1
500 TABLET, FILM COATED ORAL ONCE
Status: COMPLETED | OUTPATIENT
Start: 2025-02-12 | End: 2025-02-12

## 2025-02-12 RX ORDER — HYDROMORPHONE HYDROCHLORIDE 2 MG/1
1 TABLET ORAL EVERY 6 HOURS
Status: DISCONTINUED | OUTPATIENT
Start: 2025-02-12 | End: 2025-02-13

## 2025-02-12 RX ORDER — LOSARTAN POTASSIUM 50 MG/1
100 TABLET ORAL DAILY
Status: DISCONTINUED | OUTPATIENT
Start: 2025-02-12 | End: 2025-02-15 | Stop reason: HOSPADM

## 2025-02-12 RX ORDER — MORPHINE SULFATE 2 MG/ML
2 INJECTION, SOLUTION INTRAMUSCULAR; INTRAVENOUS EVERY 4 HOURS PRN
Status: DISCONTINUED | OUTPATIENT
Start: 2025-02-12 | End: 2025-02-12

## 2025-02-12 RX ORDER — SERTRALINE HYDROCHLORIDE 100 MG/1
100 TABLET, FILM COATED ORAL DAILY
COMMUNITY

## 2025-02-12 RX ORDER — ONDANSETRON HYDROCHLORIDE 2 MG/ML
4 INJECTION, SOLUTION INTRAVENOUS ONCE
Status: COMPLETED | OUTPATIENT
Start: 2025-02-12 | End: 2025-02-12

## 2025-02-12 RX ORDER — METHOCARBAMOL 500 MG/1
500 TABLET, FILM COATED ORAL EVERY 8 HOURS SCHEDULED
Status: DISCONTINUED | OUTPATIENT
Start: 2025-02-12 | End: 2025-02-15 | Stop reason: HOSPADM

## 2025-02-12 RX ORDER — DICYCLOMINE HYDROCHLORIDE 20 MG/1
20 TABLET ORAL
Status: DISCONTINUED | OUTPATIENT
Start: 2025-02-12 | End: 2025-02-15 | Stop reason: HOSPADM

## 2025-02-12 RX ORDER — OXYCODONE HYDROCHLORIDE 5 MG/1
5 TABLET ORAL EVERY 6 HOURS PRN
Status: DISCONTINUED | OUTPATIENT
Start: 2025-02-12 | End: 2025-02-13

## 2025-02-12 RX ORDER — OXYCODONE HYDROCHLORIDE 10 MG/1
10 TABLET ORAL EVERY 6 HOURS PRN
Status: DISCONTINUED | OUTPATIENT
Start: 2025-02-12 | End: 2025-02-13

## 2025-02-12 RX ORDER — IBUPROFEN 400 MG/1
800 TABLET ORAL EVERY 6 HOURS SCHEDULED
Status: DISCONTINUED | OUTPATIENT
Start: 2025-02-12 | End: 2025-02-12

## 2025-02-12 RX ORDER — OXYCODONE AND ACETAMINOPHEN 5; 325 MG/1; MG/1
1 TABLET ORAL ONCE AS NEEDED
Status: COMPLETED | OUTPATIENT
Start: 2025-02-12 | End: 2025-02-12

## 2025-02-12 RX ORDER — PANTOPRAZOLE SODIUM 40 MG/1
40 TABLET, DELAYED RELEASE ORAL
Status: DISCONTINUED | OUTPATIENT
Start: 2025-02-13 | End: 2025-02-15 | Stop reason: HOSPADM

## 2025-02-12 RX ORDER — ENOXAPARIN SODIUM 100 MG/ML
30 INJECTION SUBCUTANEOUS EVERY 24 HOURS
Status: DISCONTINUED | OUTPATIENT
Start: 2025-02-12 | End: 2025-02-15 | Stop reason: HOSPADM

## 2025-02-12 RX ADMIN — ACETAMINOPHEN 650 MG: 325 TABLET ORAL at 20:37

## 2025-02-12 RX ADMIN — DICYCLOMINE HYDROCHLORIDE 20 MG: 10 CAPSULE ORAL at 20:37

## 2025-02-12 RX ADMIN — POLYETHYLENE GLYCOL 3350 17 G: 17 POWDER, FOR SOLUTION ORAL at 15:48

## 2025-02-12 RX ADMIN — TRIAMTERENE AND HYDROCHLOROTHIAZIDE 1 TABLET: 37.5; 25 TABLET ORAL at 15:39

## 2025-02-12 RX ADMIN — OXYCODONE HYDROCHLORIDE AND ACETAMINOPHEN 1 TABLET: 5; 325 TABLET ORAL at 13:04

## 2025-02-12 RX ADMIN — SERTRALINE HYDROCHLORIDE 100 MG: 50 TABLET ORAL at 15:39

## 2025-02-12 RX ADMIN — METHOCARBAMOL TABLETS 500 MG: 500 TABLET, COATED ORAL at 13:09

## 2025-02-12 RX ADMIN — METHOCARBAMOL TABLETS 500 MG: 500 TABLET, COATED ORAL at 22:03

## 2025-02-12 RX ADMIN — LOSARTAN POTASSIUM 100 MG: 50 TABLET, FILM COATED ORAL at 15:39

## 2025-02-12 RX ADMIN — HYDROMORPHONE HYDROCHLORIDE 1 MG: 2 TABLET ORAL at 21:59

## 2025-02-12 RX ADMIN — LIDOCAINE 4% 1 PATCH: 40 PATCH TOPICAL at 13:04

## 2025-02-12 RX ADMIN — ONDANSETRON 4 MG: 2 INJECTION INTRAMUSCULAR; INTRAVENOUS at 11:36

## 2025-02-12 RX ADMIN — MORPHINE SULFATE 2 MG: 2 INJECTION, SOLUTION INTRAMUSCULAR; INTRAVENOUS at 11:36

## 2025-02-12 RX ADMIN — ACETAMINOPHEN 650 MG: 325 TABLET ORAL at 15:39

## 2025-02-12 RX ADMIN — ENOXAPARIN SODIUM 30 MG: 30 INJECTION SUBCUTANEOUS at 15:39

## 2025-02-12 SDOH — ECONOMIC STABILITY: HOUSING INSECURITY: AT ANY TIME IN THE PAST 12 MONTHS, WERE YOU HOMELESS OR LIVING IN A SHELTER (INCLUDING NOW)?: NO

## 2025-02-12 SDOH — ECONOMIC STABILITY: HOUSING INSECURITY: IN THE LAST 12 MONTHS, WAS THERE A TIME WHEN YOU WERE NOT ABLE TO PAY THE MORTGAGE OR RENT ON TIME?: NO

## 2025-02-12 SDOH — SOCIAL STABILITY: SOCIAL INSECURITY: WITHIN THE LAST YEAR, HAVE YOU BEEN HUMILIATED OR EMOTIONALLY ABUSED IN OTHER WAYS BY YOUR PARTNER OR EX-PARTNER?: NO

## 2025-02-12 SDOH — ECONOMIC STABILITY: INCOME INSECURITY: IN THE PAST 12 MONTHS HAS THE ELECTRIC, GAS, OIL, OR WATER COMPANY THREATENED TO SHUT OFF SERVICES IN YOUR HOME?: NO

## 2025-02-12 SDOH — SOCIAL STABILITY: SOCIAL INSECURITY
WITHIN THE LAST YEAR, HAVE YOU BEEN RAPED OR FORCED TO HAVE ANY KIND OF SEXUAL ACTIVITY BY YOUR PARTNER OR EX-PARTNER?: NO

## 2025-02-12 SDOH — ECONOMIC STABILITY: TRANSPORTATION INSECURITY: IN THE PAST 12 MONTHS, HAS LACK OF TRANSPORTATION KEPT YOU FROM MEDICAL APPOINTMENTS OR FROM GETTING MEDICATIONS?: NO

## 2025-02-12 SDOH — ECONOMIC STABILITY: FOOD INSECURITY: WITHIN THE PAST 12 MONTHS, YOU WORRIED THAT YOUR FOOD WOULD RUN OUT BEFORE YOU GOT THE MONEY TO BUY MORE.: NEVER TRUE

## 2025-02-12 SDOH — SOCIAL STABILITY: SOCIAL INSECURITY: WERE YOU ABLE TO COMPLETE ALL THE BEHAVIORAL HEALTH SCREENINGS?: YES

## 2025-02-12 SDOH — ECONOMIC STABILITY: FOOD INSECURITY: WITHIN THE PAST 12 MONTHS, THE FOOD YOU BOUGHT JUST DIDN'T LAST AND YOU DIDN'T HAVE MONEY TO GET MORE.: NEVER TRUE

## 2025-02-12 SDOH — ECONOMIC STABILITY: FOOD INSECURITY: HOW HARD IS IT FOR YOU TO PAY FOR THE VERY BASICS LIKE FOOD, HOUSING, MEDICAL CARE, AND HEATING?: NOT VERY HARD

## 2025-02-12 SDOH — ECONOMIC STABILITY: HOUSING INSECURITY: IN THE PAST 12 MONTHS, HOW MANY TIMES HAVE YOU MOVED WHERE YOU WERE LIVING?: 0

## 2025-02-12 SDOH — SOCIAL STABILITY: SOCIAL INSECURITY: WITHIN THE LAST YEAR, HAVE YOU BEEN AFRAID OF YOUR PARTNER OR EX-PARTNER?: NO

## 2025-02-12 SDOH — SOCIAL STABILITY: SOCIAL INSECURITY
WITHIN THE LAST YEAR, HAVE YOU BEEN KICKED, HIT, SLAPPED, OR OTHERWISE PHYSICALLY HURT BY YOUR PARTNER OR EX-PARTNER?: NO

## 2025-02-12 SDOH — SOCIAL STABILITY: SOCIAL INSECURITY: HAVE YOU HAD THOUGHTS OF HARMING ANYONE ELSE?: NO

## 2025-02-12 ASSESSMENT — PAIN SCALES - GENERAL
PAINLEVEL_OUTOF10: 0 - NO PAIN
PAINLEVEL_OUTOF10: 8
PAINLEVEL_OUTOF10: 1
PAINLEVEL_OUTOF10: 10 - WORST POSSIBLE PAIN
PAINLEVEL_OUTOF10: 4
PAINLEVEL_OUTOF10: 0 - NO PAIN

## 2025-02-12 ASSESSMENT — PAIN - FUNCTIONAL ASSESSMENT
PAIN_FUNCTIONAL_ASSESSMENT: 0-10

## 2025-02-12 ASSESSMENT — COGNITIVE AND FUNCTIONAL STATUS - GENERAL
MOVING TO AND FROM BED TO CHAIR: A LITTLE
DAILY ACTIVITIY SCORE: 20
WALKING IN HOSPITAL ROOM: A LITTLE
CLIMB 3 TO 5 STEPS WITH RAILING: A LITTLE
MOBILITY SCORE: 18
HELP NEEDED FOR BATHING: A LITTLE
TURNING FROM BACK TO SIDE WHILE IN FLAT BAD: A LITTLE
PATIENT BASELINE BEDBOUND: NO
STANDING UP FROM CHAIR USING ARMS: A LITTLE
DRESSING REGULAR LOWER BODY CLOTHING: A LITTLE
DRESSING REGULAR UPPER BODY CLOTHING: A LITTLE
TOILETING: A LITTLE
MOVING FROM LYING ON BACK TO SITTING ON SIDE OF FLAT BED WITH BEDRAILS: A LITTLE

## 2025-02-12 ASSESSMENT — ENCOUNTER SYMPTOMS
WHEEZING: 0
DIARRHEA: 0
JOINT SWELLING: 0
ABDOMINAL PAIN: 0
WEAKNESS: 0
HEMATURIA: 0
COUGH: 0
ACTIVITY CHANGE: 1
HEADACHES: 0
FEVER: 0
SHORTNESS OF BREATH: 0
FATIGUE: 0
WOUND: 0
CONSTIPATION: 0
BACK PAIN: 0
NAUSEA: 0
PALPITATIONS: 0
DIFFICULTY URINATING: 0
CHEST TIGHTNESS: 0
CHILLS: 0
VOMITING: 0
FLANK PAIN: 0
TREMORS: 0

## 2025-02-12 ASSESSMENT — LIFESTYLE VARIABLES
HAVE YOU EVER FELT YOU SHOULD CUT DOWN ON YOUR DRINKING: NO
PRESCIPTION_ABUSE_PAST_12_MONTHS: NO
EVER FELT BAD OR GUILTY ABOUT YOUR DRINKING: NO
AUDIT-C TOTAL SCORE: 0
AUDIT-C TOTAL SCORE: 0
HOW OFTEN DO YOU HAVE A DRINK CONTAINING ALCOHOL: NEVER
TOTAL SCORE: 0
HOW OFTEN DO YOU HAVE 6 OR MORE DRINKS ON ONE OCCASION: NEVER
HAVE PEOPLE ANNOYED YOU BY CRITICIZING YOUR DRINKING: NO
EVER HAD A DRINK FIRST THING IN THE MORNING TO STEADY YOUR NERVES TO GET RID OF A HANGOVER: NO
SUBSTANCE_ABUSE_PAST_12_MONTHS: NO
HOW MANY STANDARD DRINKS CONTAINING ALCOHOL DO YOU HAVE ON A TYPICAL DAY: PATIENT DOES NOT DRINK
SKIP TO QUESTIONS 9-10: 1

## 2025-02-12 ASSESSMENT — ACTIVITIES OF DAILY LIVING (ADL)
TOILETING: INDEPENDENT
PATIENT'S MEMORY ADEQUATE TO SAFELY COMPLETE DAILY ACTIVITIES?: YES
GROOMING: INDEPENDENT
ADEQUATE_TO_COMPLETE_ADL: YES
HEARING - RIGHT EAR: DIFFICULTY WITH NOISE
HEARING - LEFT EAR: DIFFICULTY WITH NOISE
DRESSING YOURSELF: INDEPENDENT
BATHING: INDEPENDENT
FEEDING YOURSELF: INDEPENDENT
WALKS IN HOME: INDEPENDENT
LACK_OF_TRANSPORTATION: NO
LACK_OF_TRANSPORTATION: NO
JUDGMENT_ADEQUATE_SAFELY_COMPLETE_DAILY_ACTIVITIES: YES
ASSISTIVE_DEVICE: EYEGLASSES;CANE
LACK_OF_TRANSPORTATION: NO

## 2025-02-12 ASSESSMENT — PATIENT HEALTH QUESTIONNAIRE - PHQ9
SUM OF ALL RESPONSES TO PHQ9 QUESTIONS 1 & 2: 0
2. FEELING DOWN, DEPRESSED OR HOPELESS: NOT AT ALL
1. LITTLE INTEREST OR PLEASURE IN DOING THINGS: NOT AT ALL

## 2025-02-12 NOTE — CONSULTS
TRAUMA/GENERAL SURGERY CONSULTATION NOTE    Luther Silverio   12/19/1925   [id number]     Inpatient consult to Trauma Surgery  Consult performed by: Munira Treviño DO  Consult ordered by: Dany Cuello PA-C      Reason For Consult  Trauma consult, multiple rib fractures    History Of Present Illness  Luther Silverio is a 89 y.o. male with history of hypertension, CKD 3, hyperlipidemia, ED who presented due to ongoing right back and rib pain.  He reports that he normally ambulates well with a cane.  On Monday he was grabbing something in the kitchen and he tripped on something, he is unsure of what and fell backwards and hit his right back rib area on a doorknob.  He did not hit his head or lose consciousness.  He he reports he did not pass out prior to falling and remembers the event.  He has been having pain in the area since Monday.  He has been self-medicating however the pain is getting worse when moving and he is having difficulty moving around for which he had just come to the ER to be evaluated.  He does not take any blood thinners for any reason.  Denies fevers, chills, redness of breath, pain with breathing, abdominal pain, nausea, vomiting, or urinary symptoms.     Past Medical History  Past Medical History:   Diagnosis Date    Hypertension     Renal disorder        Surgical History  Past Surgical History:   Procedure Laterality Date    OTHER SURGICAL HISTORY  06/30/2020    Hernia repair    OTHER SURGICAL HISTORY  06/30/2020    Appendectomy       Medications  No current facility-administered medications on file prior to encounter.     Current Outpatient Medications on File Prior to Encounter   Medication Sig Dispense Refill    dicyclomine (Bentyl) 20 mg tablet Take 1 tablet (20 mg) by mouth 4 times a day before meals.      triamterene-hydrochlorothiazid (Maxzide-25) 37.5-25 mg tablet Take 1 tablet by mouth once daily.      losartan (Cozaar) 100 mg tablet Take 1 tablet (100 mg) by mouth once daily.       "mupirocin (Bactroban) 2 % ointment Apply 1 Application topically 3 times a day. 30 g 1    sertraline (Zoloft) 100 mg tablet Take 1 tablet (100 mg) by mouth once daily.         Allergies  Sulfamethoxazole-trimethoprim     Social History  He reports that he has never smoked. He has never used smokeless tobacco. He reports that he does not drink alcohol. No history on file for drug use.    Family History  No family history on file.     Review of Systems   Constitutional:  Negative for chills and fever.   Respiratory:  Negative for shortness of breath.    Cardiovascular:  Negative for chest pain.   Gastrointestinal:  Negative for abdominal pain, nausea and vomiting.   Genitourinary:  Negative for difficulty urinating.   Musculoskeletal:         Reports right sided rib pain when moving.   Skin:  Negative for rash.   Neurological:  Negative for headaches.       Last Recorded Vitals  Blood pressure 139/69, pulse 56, temperature 36.8 °C (98.3 °F), temperature source Temporal, resp. rate 15, height 1.803 m (5' 11\"), weight 92.5 kg (204 lb), SpO2 95%.     Physical Exam  Constitutional:       General: He is not in acute distress.     Appearance: He is not toxic-appearing.   HENT:      Head: Normocephalic and atraumatic.      Right Ear: External ear normal.      Left Ear: External ear normal.      Nose: Nose normal.      Mouth/Throat:      Mouth: Mucous membranes are moist.   Eyes:      General: No scleral icterus.     Pupils: Pupils are equal, round, and reactive to light.   Cardiovascular:      Rate and Rhythm: Normal rate and regular rhythm.      Pulses: Normal pulses.   Pulmonary:      Effort: Pulmonary effort is normal.      Breath sounds: Normal breath sounds.      Comments: Tenderness posterior right sided ribs.  No crepitus.  Some light bruising in the mid axillary area.  Chest:      Chest wall: Tenderness present.   Abdominal:      General: There is no distension.      Palpations: Abdomen is soft.      Tenderness: " There is no abdominal tenderness.   Musculoskeletal:         General: No swelling.      Cervical back: Neck supple. No tenderness.   Skin:     General: Skin is warm and dry.   Neurological:      General: No focal deficit present.      Mental Status: He is alert.          Relevant Results:  Labs:  Results for orders placed or performed during the hospital encounter of 02/12/25 (from the past 24 hours)   CBC and Auto Differential   Result Value Ref Range    WBC 4.3 (L) 4.4 - 11.3 x10*3/uL    nRBC 0.0 0.0 - 0.0 /100 WBCs    RBC 3.38 (L) 4.50 - 5.90 x10*6/uL    Hemoglobin 10.1 (L) 13.5 - 17.5 g/dL    Hematocrit 30.1 (L) 41.0 - 52.0 %    MCV 89 80 - 100 fL    MCH 29.9 26.0 - 34.0 pg    MCHC 33.6 32.0 - 36.0 g/dL    RDW 14.2 11.5 - 14.5 %    Platelets 92 (L) 150 - 450 x10*3/uL    Neutrophils % 71.0 40.0 - 80.0 %    Immature Granulocytes %, Automated 0.5 0.0 - 0.9 %    Lymphocytes % 16.9 13.0 - 44.0 %    Monocytes % 11.1 2.0 - 10.0 %    Eosinophils % 0.0 0.0 - 6.0 %    Basophils % 0.5 0.0 - 2.0 %    Neutrophils Absolute 3.08 1.60 - 5.50 x10*3/uL    Immature Granulocytes Absolute, Automated 0.02 0.00 - 0.50 x10*3/uL    Lymphocytes Absolute 0.73 (L) 0.80 - 3.00 x10*3/uL    Monocytes Absolute 0.48 0.05 - 0.80 x10*3/uL    Eosinophils Absolute 0.00 0.00 - 0.40 x10*3/uL    Basophils Absolute 0.02 0.00 - 0.10 x10*3/uL   Comprehensive metabolic panel   Result Value Ref Range    Glucose 121 (H) 74 - 99 mg/dL    Sodium 137 136 - 145 mmol/L    Potassium 4.1 3.5 - 5.3 mmol/L    Chloride 108 (H) 98 - 107 mmol/L    Bicarbonate 23 21 - 32 mmol/L    Anion Gap 10 10 - 20 mmol/L    Urea Nitrogen 36 (H) 6 - 23 mg/dL    Creatinine 1.73 (H) 0.50 - 1.30 mg/dL    eGFR 35 (L) >60 mL/min/1.73m*2    Calcium 8.5 (L) 8.6 - 10.3 mg/dL    Albumin 3.6 3.4 - 5.0 g/dL    Alkaline Phosphatase 67 33 - 136 U/L    Total Protein 6.4 6.4 - 8.2 g/dL    AST 14 9 - 39 U/L    Bilirubin, Total 0.8 0.0 - 1.2 mg/dL    ALT 11 10 - 52 U/L   Coagulation Screen   Result  Value Ref Range    Protime 13.2 (H) 9.8 - 12.8 seconds    INR 1.2 (H) 0.9 - 1.1    aPTT 27 27 - 38 seconds   Morphology   Result Value Ref Range    RBC Morphology See Below     Hypochromia Mild     Ovalocytes Few    ECG 12 lead   Result Value Ref Range    Ventricular Rate 57 BPM    Atrial Rate 57 BPM    SC Interval 267 ms    QRS Duration 151 ms    QT Interval 463 ms    QTC Calculation(Bazett) 451 ms    P Axis 2 degrees    R Axis 33 degrees    T Axis 8 degrees    QRS Count 9 beats    Q Onset 252 ms    T Offset 484 ms    QTC Fredericia 455 ms   Type and Screen   Result Value Ref Range    ABO TYPE A     Rh TYPE POS     ANTIBODY SCREEN NEG    Sars-CoV-2 and Influenza A/B PCR   Result Value Ref Range    Flu A Result Not Detected Not Detected    Flu B Result Not Detected Not Detected    Coronavirus 2019, PCR Not Detected Not Detected       Imaging:  CT lumbar spine wo IV contrast  Narrative: STUDY:  CT Chest, Abdomen, and Pelvis without IV Contrast, CT Thoracic Spine  and Lumbar Spine without IV; 2/12/2025 12:22 PM.  INDICATION:  Right rib pain with midline thoracic and lumbar spine pain; Status  post fall on Monday.  COMPARISON:  Pelvis radiograph 7/9/2024.  US renal 12/27/2021.  ACCESSION NUMBER(S):  [id number], [id number], [id number]  ORDERING CLINICIAN:  HERBIE ZULETA  TECHNIQUE:  CT of the chest, abdomen, and pelvis was performed.  Contiguous axial  images were obtained at 3 mm slice thickness through the chest,  abdomen, and pelvis.  Coronal and sagittal reconstructions at 3 mm  slice thickness were performed.  No intravenous contrast was  administered.  Please note that spinal images were generated from the  original CT abdomen and pelvis imaging.   FINDINGS:  Please note that the evaluation of vessels, lymph nodes and organs is  limited without intravenous contrast.   CHEST:  There is enlargement of the right lobe of the thyroid.  This is best  evaluated with ultrasound.  MEDIASTINUM:  The heart is normal  in size without pericardial effusion.  Coronary  artery calcifications are identified.    LUNGS/PLEURA:  There is a right pleural effusion.  There are bibasilar pulmonary infiltrates.  These may represent  atelectasis.  LYMPH NODES:  Thoracic lymph nodes are not enlarged.  ABDOMEN:     LIVER:  No hepatomegaly.  Smooth surface contour.  Normal attenuation.  There  is a 2.8 cm lucency posteriorly in the right lobe of the liver.  This  most likely represents hepatic cyst.     BILE DUCTS:  No intrahepatic or extrahepatic biliary ductal dilatation.     GALLBLADDER:  There are gallstones in the neck of the gallbladder.  STOMACH:  No abnormalities identified.     PANCREAS:  No masses or ductal dilatation.     SPLEEN:  No splenomegaly or focal splenic lesion.     ADRENAL GLANDS:  No thickening or nodules.     KIDNEYS AND URETERS:  Kidneys are normal in size and location.  No renal or ureteral  calculi.  There is a 2.2 cm cyst on the left kidney.     PELVIS:     BLADDER:  No abnormalities identified.     REPRODUCTIVE ORGANS:  No abnormalities identified.     BOWEL:  There is a duodenal diverticulum.  VESSELS:  No abnormalities identified.  Abdominal aorta is normal in caliber.      PERITONEUM/RETROPERITONEUM/LYMPH NODES:  No free fluid.  No pneumoperitoneum.  No lymphadenopathy.     ABDOMINAL WALL:  No abnormalities identified.  SOFT TISSUES:   No abnormalities identified.     BONES:  There are acute fractures of the right wrist series 6, seventh, eighth  and ninth ribs.  The eighth and ninth ribs are fractured in multiple  locations.  THORACIC SPINE:  The alignment is anatomic.  There is no fracture or traumatic  subluxation.  The vertebral body heights are well maintained.  There is diffuse  intervertebral disc space narrowing.  This is most pronounced at the  thoracolumbar junction with anterior osteophytes.  The paravertebral soft tissues are within normal limits.    LUMBAR SPINE:  The alignment is anatomic.  There is no  fracture or traumatic  subluxation.  The vertebral body heights are well maintained.  There is diffuse  intervertebral disc space narrowing with vacuum discs.  There is bony  fusion of L4 on L5.  The paravertebral soft tissues are within normal limits.    Impression: Acute fractures of the right posterior sixth, seventh, eighth and  ninth ribs with the eighth and ninth ribs fractured in multiple  locations.  Bibasilar pulmonary infiltrates with right pleural effusion.  Cholelithiasis.  Enlargement of the right lobe of the thyroid.  This is best evaluated  with ultrasound.  Diffuse degenerative changes of the thoracic and lumbosacral spines.  Signed by Rio Gross MD  CT chest abdomen pelvis wo IV contrast  Narrative: STUDY:  CT Chest, Abdomen, and Pelvis without IV Contrast, CT Thoracic Spine  and Lumbar Spine without IV; 2/12/2025 12:22 PM.  INDICATION:  Right rib pain with midline thoracic and lumbar spine pain; Status  post fall on Monday.  COMPARISON:  Pelvis radiograph 7/9/2024.  US renal 12/27/2021.  ACCESSION NUMBER(S):  [id number], [id number], [id number]  ORDERING CLINICIAN:  HERBIE ZULETA  TECHNIQUE:  CT of the chest, abdomen, and pelvis was performed.  Contiguous axial  images were obtained at 3 mm slice thickness through the chest,  abdomen, and pelvis.  Coronal and sagittal reconstructions at 3 mm  slice thickness were performed.  No intravenous contrast was  administered.  Please note that spinal images were generated from the  original CT abdomen and pelvis imaging.   FINDINGS:  Please note that the evaluation of vessels, lymph nodes and organs is  limited without intravenous contrast.   CHEST:  There is enlargement of the right lobe of the thyroid.  This is best  evaluated with ultrasound.  MEDIASTINUM:  The heart is normal in size without pericardial effusion.  Coronary  artery calcifications are identified.    LUNGS/PLEURA:  There is a right pleural effusion.  There are bibasilar pulmonary  infiltrates.  These may represent  atelectasis.  LYMPH NODES:  Thoracic lymph nodes are not enlarged.  ABDOMEN:     LIVER:  No hepatomegaly.  Smooth surface contour.  Normal attenuation.  There  is a 2.8 cm lucency posteriorly in the right lobe of the liver.  This  most likely represents hepatic cyst.     BILE DUCTS:  No intrahepatic or extrahepatic biliary ductal dilatation.     GALLBLADDER:  There are gallstones in the neck of the gallbladder.  STOMACH:  No abnormalities identified.     PANCREAS:  No masses or ductal dilatation.     SPLEEN:  No splenomegaly or focal splenic lesion.     ADRENAL GLANDS:  No thickening or nodules.     KIDNEYS AND URETERS:  Kidneys are normal in size and location.  No renal or ureteral  calculi.  There is a 2.2 cm cyst on the left kidney.     PELVIS:     BLADDER:  No abnormalities identified.     REPRODUCTIVE ORGANS:  No abnormalities identified.     BOWEL:  There is a duodenal diverticulum.  VESSELS:  No abnormalities identified.  Abdominal aorta is normal in caliber.      PERITONEUM/RETROPERITONEUM/LYMPH NODES:  No free fluid.  No pneumoperitoneum.  No lymphadenopathy.     ABDOMINAL WALL:  No abnormalities identified.  SOFT TISSUES:   No abnormalities identified.     BONES:  There are acute fractures of the right wrist series 6, seventh, eighth  and ninth ribs.  The eighth and ninth ribs are fractured in multiple  locations.  THORACIC SPINE:  The alignment is anatomic.  There is no fracture or traumatic  subluxation.  The vertebral body heights are well maintained.  There is diffuse  intervertebral disc space narrowing.  This is most pronounced at the  thoracolumbar junction with anterior osteophytes.  The paravertebral soft tissues are within normal limits.    LUMBAR SPINE:  The alignment is anatomic.  There is no fracture or traumatic  subluxation.  The vertebral body heights are well maintained.  There is diffuse  intervertebral disc space narrowing with vacuum discs.  There is  bony  fusion of L4 on L5.  The paravertebral soft tissues are within normal limits.    Impression: Acute fractures of the right posterior sixth, seventh, eighth and  ninth ribs with the eighth and ninth ribs fractured in multiple  locations.  Bibasilar pulmonary infiltrates with right pleural effusion.  Cholelithiasis.  Enlargement of the right lobe of the thyroid.  This is best evaluated  with ultrasound.  Diffuse degenerative changes of the thoracic and lumbosacral spines.  Signed by Rio Gross MD  CT thoracic spine wo IV contrast  Narrative: STUDY:  CT Chest, Abdomen, and Pelvis without IV Contrast, CT Thoracic Spine  and Lumbar Spine without IV; 2/12/2025 12:22 PM.  INDICATION:  Right rib pain with midline thoracic and lumbar spine pain; Status  post fall on Monday.  COMPARISON:  Pelvis radiograph 7/9/2024.  US renal 12/27/2021.  ACCESSION NUMBER(S):  [id number], [id number], [id number]  ORDERING CLINICIAN:  HERBIE ZULETA  TECHNIQUE:  CT of the chest, abdomen, and pelvis was performed.  Contiguous axial  images were obtained at 3 mm slice thickness through the chest,  abdomen, and pelvis.  Coronal and sagittal reconstructions at 3 mm  slice thickness were performed.  No intravenous contrast was  administered.  Please note that spinal images were generated from the  original CT abdomen and pelvis imaging.   FINDINGS:  Please note that the evaluation of vessels, lymph nodes and organs is  limited without intravenous contrast.   CHEST:  There is enlargement of the right lobe of the thyroid.  This is best  evaluated with ultrasound.  MEDIASTINUM:  The heart is normal in size without pericardial effusion.  Coronary  artery calcifications are identified.    LUNGS/PLEURA:  There is a right pleural effusion.  There are bibasilar pulmonary infiltrates.  These may represent  atelectasis.  LYMPH NODES:  Thoracic lymph nodes are not enlarged.  ABDOMEN:     LIVER:  No hepatomegaly.  Smooth surface contour.  Normal  attenuation.  There  is a 2.8 cm lucency posteriorly in the right lobe of the liver.  This  most likely represents hepatic cyst.     BILE DUCTS:  No intrahepatic or extrahepatic biliary ductal dilatation.     GALLBLADDER:  There are gallstones in the neck of the gallbladder.  STOMACH:  No abnormalities identified.     PANCREAS:  No masses or ductal dilatation.     SPLEEN:  No splenomegaly or focal splenic lesion.     ADRENAL GLANDS:  No thickening or nodules.     KIDNEYS AND URETERS:  Kidneys are normal in size and location.  No renal or ureteral  calculi.  There is a 2.2 cm cyst on the left kidney.     PELVIS:     BLADDER:  No abnormalities identified.     REPRODUCTIVE ORGANS:  No abnormalities identified.     BOWEL:  There is a duodenal diverticulum.  VESSELS:  No abnormalities identified.  Abdominal aorta is normal in caliber.      PERITONEUM/RETROPERITONEUM/LYMPH NODES:  No free fluid.  No pneumoperitoneum.  No lymphadenopathy.     ABDOMINAL WALL:  No abnormalities identified.  SOFT TISSUES:   No abnormalities identified.     BONES:  There are acute fractures of the right wrist series 6, seventh, eighth  and ninth ribs.  The eighth and ninth ribs are fractured in multiple  locations.  THORACIC SPINE:  The alignment is anatomic.  There is no fracture or traumatic  subluxation.  The vertebral body heights are well maintained.  There is diffuse  intervertebral disc space narrowing.  This is most pronounced at the  thoracolumbar junction with anterior osteophytes.  The paravertebral soft tissues are within normal limits.    LUMBAR SPINE:  The alignment is anatomic.  There is no fracture or traumatic  subluxation.  The vertebral body heights are well maintained.  There is diffuse  intervertebral disc space narrowing with vacuum discs.  There is bony  fusion of L4 on L5.  The paravertebral soft tissues are within normal limits.    Impression: Acute fractures of the right posterior sixth, seventh, eighth and  ninth  ribs with the eighth and ninth ribs fractured in multiple  locations.  Bibasilar pulmonary infiltrates with right pleural effusion.  Cholelithiasis.  Enlargement of the right lobe of the thyroid.  This is best evaluated  with ultrasound.  Diffuse degenerative changes of the thoracic and lumbosacral spines.  Signed by Rio Gross MD  ECG 12 lead  Sinus rhythm  Prolonged MS interval  Right bundle branch block      Assessment and Plan  Assessment & Plan  Fall, initial encounter    89 y.o. male with history of hypertension, CKD 3, HLD who presented after a mechanical fall on Monday with ongoing pain to his right sided rib and back.  CT of the chest was obtained in the ER and demonstrated right-sided rib fractures 6 through 9 with ribs 8 through 9 having multiple locations of fractures.  No other injuries noted.  Patient is on room air currently.  Agree with IMS admission to stepdown unit for close respiratory monitoring and multimodal pain control.  Encourage I-S use and pulmonary hygiene.    Plan:  - Agree with IMS admission to SDU for close respiratory monitoring and multimodal pain control  - Recommend scheduled Tylenol, avoid NSAIDs due to chronic kidney disease, recommend schedule Robaxin, lidocaine patches  - Discussed with anesthesia, unable to provide complex CLAY blocks at this facility  - Maintain good pulmonary hygiene, encourage I-S use at least 4 times per hour while awake  - Recommend telemetry and continuous pulse ox  - Recommend PT and OT  - SCDs and Lovenox    Discussed with attending Dr. Marielle Treviño,  - PGY4  General Surgery

## 2025-02-12 NOTE — ED TRIAGE NOTES
Pt states on Monday he tripped and fell hitting his right back/ribs. He has bruising to that area and is tender. No head or neck pain. No LOC. Denies falling due to dizziness or lightheadedness.

## 2025-02-12 NOTE — H&P
[facility] - GENERAL MEDICINE HISTORY AND PHYSICAL    History Obtained From (Primary Source): Patient  Collateral History (Secondary Sources): D/w ED provider, chart review    History Of Present Illness (HPI):  Luther Silverio is a 89 y.o. male with PMHx s/f HTN, HLD, CKD stage IIIb, recurrent MDD, prediabetes, ED presenting with right back/rib pain.  He states he was grabbing something from the kitchen on Monday, tripped on something and fell backward on a doorknob and has bruising to right back rib area and is tender with any movement especially aggravated with coughing.  Denies hitting his head, LOC, being on blood thinners.  Denies lightheadedness, dizziness, neck pain, shortness of breath.    ED Course (Summary - please note all labs, imaging studies, and interventions noted below have been personally reviewed and/or interpreted on day of admission):   Vitals on presentation: 98.3 F, 60 bpm, 61 RR, 131/77, 95% on RA  Labs: CMP-glucose 121, BUN/creatinine 36/1.73, EGFR 35  INR/PT 1.2/13.2  CBC-WBC 4.3, hemoglobin 10.1, platelets 92, lymphocyte subset 0.73  EKG: sinus rhythm at 57 bpm, prolonged OK, right bundle branch block.  .  QTc 451.  Imaging: CT thoracic, lumbar, chest AP-Acute fractures of the right posterior sixth, seventh, eighth and ninth ribs with the eighth and ninth ribs fractured in multiple locations. Bibasilar pulmonary infiltrates with right pleural effusion. Cholelithiasis. Enlargement of the right lobe of the thyroid.  This is best evaluated with ultrasound. Diffuse degenerative changes of the thoracic and lumbosacral spines.  Interventions: Robaxin 500 mg, morphine 2 mg, Zofran 4 mg, admission further management    12-point ROS reviewed and found to be negative aside from aforementioned positives in HPI and/or noted in dedicated ROS section below.     ED Course (From ED Provider):  ED Course as of 02/12/25 1354   Wed Feb 12, 2025   1137 Patient twelve-lead EKG interpreted  by myself shows sinus rhythm, intergrade of 57, first-degree AV block with a NE interval 267 ms, normal axis, right bundle branch block present, normal QT, no STEMI. [WJ]   1224 CT chest abdomen pelvis wo IV contrast  I Independently reviewed the patient's CT scan, no pneumothorax I do believe that there are 2 rib fractures at least visualized [WJ]   1227 Creatinine(!): 1.73  At baseline [WJ]   1305 CT chest abdomen pelvis wo IV contrast  Acute fractures of the right posterior sixth, seventh, eighth and  ninth ribs with the eighth and ninth ribs fractured in multiple  locations.   [WJ]   1309 Paged on-call trauma surgeon, Dr. Palomares [WJ]   1311 Discussed with Dr. Palomares, plan to admit to medicine.  Will require stepdown bed per her recommendation [WJ]      ED Course User Index  [WJ] Uche Jack, DO         Diagnoses as of 02/12/25 1354   Fall, initial encounter     Relevant Results  Results for orders placed or performed during the hospital encounter of 02/12/25 (from the past 24 hours)   CBC and Auto Differential   Result Value Ref Range    WBC 4.3 (L) 4.4 - 11.3 x10*3/uL    nRBC 0.0 0.0 - 0.0 /100 WBCs    RBC 3.38 (L) 4.50 - 5.90 x10*6/uL    Hemoglobin 10.1 (L) 13.5 - 17.5 g/dL    Hematocrit 30.1 (L) 41.0 - 52.0 %    MCV 89 80 - 100 fL    MCH 29.9 26.0 - 34.0 pg    MCHC 33.6 32.0 - 36.0 g/dL    RDW 14.2 11.5 - 14.5 %    Platelets 92 (L) 150 - 450 x10*3/uL    Neutrophils % 71.0 40.0 - 80.0 %    Immature Granulocytes %, Automated 0.5 0.0 - 0.9 %    Lymphocytes % 16.9 13.0 - 44.0 %    Monocytes % 11.1 2.0 - 10.0 %    Eosinophils % 0.0 0.0 - 6.0 %    Basophils % 0.5 0.0 - 2.0 %    Neutrophils Absolute 3.08 1.60 - 5.50 x10*3/uL    Immature Granulocytes Absolute, Automated 0.02 0.00 - 0.50 x10*3/uL    Lymphocytes Absolute 0.73 (L) 0.80 - 3.00 x10*3/uL    Monocytes Absolute 0.48 0.05 - 0.80 x10*3/uL    Eosinophils Absolute 0.00 0.00 - 0.40 x10*3/uL    Basophils Absolute 0.02 0.00 - 0.10 x10*3/uL   Comprehensive  metabolic panel   Result Value Ref Range    Glucose 121 (H) 74 - 99 mg/dL    Sodium 137 136 - 145 mmol/L    Potassium 4.1 3.5 - 5.3 mmol/L    Chloride 108 (H) 98 - 107 mmol/L    Bicarbonate 23 21 - 32 mmol/L    Anion Gap 10 10 - 20 mmol/L    Urea Nitrogen 36 (H) 6 - 23 mg/dL    Creatinine 1.73 (H) 0.50 - 1.30 mg/dL    eGFR 35 (L) >60 mL/min/1.73m*2    Calcium 8.5 (L) 8.6 - 10.3 mg/dL    Albumin 3.6 3.4 - 5.0 g/dL    Alkaline Phosphatase 67 33 - 136 U/L    Total Protein 6.4 6.4 - 8.2 g/dL    AST 14 9 - 39 U/L    Bilirubin, Total 0.8 0.0 - 1.2 mg/dL    ALT 11 10 - 52 U/L   Coagulation Screen   Result Value Ref Range    Protime 13.2 (H) 9.8 - 12.8 seconds    INR 1.2 (H) 0.9 - 1.1    aPTT 27 27 - 38 seconds   Morphology   Result Value Ref Range    RBC Morphology See Below     Hypochromia Mild     Ovalocytes Few    ECG 12 lead   Result Value Ref Range    Ventricular Rate 57 BPM    Atrial Rate 57 BPM    HI Interval 267 ms    QRS Duration 151 ms    QT Interval 463 ms    QTC Calculation(Bazett) 451 ms    P Axis 2 degrees    R Axis 33 degrees    T Axis 8 degrees    QRS Count 9 beats    Q Onset 252 ms    T Offset 484 ms    QTC Fredericia 455 ms   Type and Screen   Result Value Ref Range    ABO TYPE A     Rh TYPE POS     ANTIBODY SCREEN NEG       CT chest abdomen pelvis wo IV contrast    Result Date: 2/12/2025  STUDY: CT Chest, Abdomen, and Pelvis without IV Contrast, CT Thoracic Spine and Lumbar Spine without IV; 2/12/2025 12:22 PM. INDICATION: Right rib pain with midline thoracic and lumbar spine pain; Status post fall on Monday. COMPARISON: Pelvis radiograph 7/9/2024.  US renal 12/27/2021. ACCESSION NUMBER(S): [id number], [id number], [id number] ORDERING CLINICIAN: HERBIE ZULETA TECHNIQUE: CT of the chest, abdomen, and pelvis was performed.  Contiguous axial images were obtained at 3 mm slice thickness through the chest, abdomen, and pelvis.  Coronal and sagittal reconstructions at 3 mm slice thickness were performed.   No intravenous contrast was administered.  Please note that spinal images were generated from the original CT abdomen and pelvis imaging. FINDINGS: Please note that the evaluation of vessels, lymph nodes and organs is limited without intravenous contrast. CHEST: There is enlargement of the right lobe of the thyroid.  This is best evaluated with ultrasound. MEDIASTINUM: The heart is normal in size without pericardial effusion.  Coronary artery calcifications are identified.  LUNGS/PLEURA: There is a right pleural effusion. There are bibasilar pulmonary infiltrates.  These may represent atelectasis. LYMPH NODES: Thoracic lymph nodes are not enlarged. ABDOMEN:  LIVER: No hepatomegaly.  Smooth surface contour.  Normal attenuation.  There is a 2.8 cm lucency posteriorly in the right lobe of the liver.  This most likely represents hepatic cyst.  BILE DUCTS: No intrahepatic or extrahepatic biliary ductal dilatation.  GALLBLADDER: There are gallstones in the neck of the gallbladder. STOMACH: No abnormalities identified.  PANCREAS: No masses or ductal dilatation.  SPLEEN: No splenomegaly or focal splenic lesion.  ADRENAL GLANDS: No thickening or nodules.  KIDNEYS AND URETERS: Kidneys are normal in size and location.  No renal or ureteral calculi.  There is a 2.2 cm cyst on the left kidney.  PELVIS:  BLADDER: No abnormalities identified.  REPRODUCTIVE ORGANS: No abnormalities identified.  BOWEL: There is a duodenal diverticulum. VESSELS: No abnormalities identified.  Abdominal aorta is normal in caliber.  PERITONEUM/RETROPERITONEUM/LYMPH NODES: No free fluid.  No pneumoperitoneum. No lymphadenopathy.  ABDOMINAL WALL: No abnormalities identified. SOFT TISSUES: No abnormalities identified.  BONES: There are acute fractures of the right wrist series 6, seventh, eighth and ninth ribs.  The eighth and ninth ribs are fractured in multiple locations. THORACIC SPINE: The alignment is anatomic.  There is no fracture or traumatic  subluxation. The vertebral body heights are well maintained.  There is diffuse intervertebral disc space narrowing.  This is most pronounced at the thoracolumbar junction with anterior osteophytes. The paravertebral soft tissues are within normal limits.  LUMBAR SPINE: The alignment is anatomic.  There is no fracture or traumatic subluxation. The vertebral body heights are well maintained.  There is diffuse intervertebral disc space narrowing with vacuum discs.  There is bony fusion of L4 on L5. The paravertebral soft tissues are within normal limits.      Acute fractures of the right posterior sixth, seventh, eighth and ninth ribs with the eighth and ninth ribs fractured in multiple locations. Bibasilar pulmonary infiltrates with right pleural effusion. Cholelithiasis. Enlargement of the right lobe of the thyroid.  This is best evaluated with ultrasound. Diffuse degenerative changes of the thoracic and lumbosacral spines. Signed by Rio Gross MD    CT thoracic spine wo IV contrast    Result Date: 2/12/2025  STUDY: CT Chest, Abdomen, and Pelvis without IV Contrast, CT Thoracic Spine and Lumbar Spine without IV; 2/12/2025 12:22 PM. INDICATION: Right rib pain with midline thoracic and lumbar spine pain; Status post fall on Monday. COMPARISON: Pelvis radiograph 7/9/2024.  US renal 12/27/2021. ACCESSION NUMBER(S): [id number], [id number], [id number] ORDERING CLINICIAN: HERBIE ZULETA TECHNIQUE: CT of the chest, abdomen, and pelvis was performed.  Contiguous axial images were obtained at 3 mm slice thickness through the chest, abdomen, and pelvis.  Coronal and sagittal reconstructions at 3 mm slice thickness were performed.  No intravenous contrast was administered.  Please note that spinal images were generated from the original CT abdomen and pelvis imaging. FINDINGS: Please note that the evaluation of vessels, lymph nodes and organs is limited without intravenous contrast. CHEST: There is enlargement of the  right lobe of the thyroid.  This is best evaluated with ultrasound. MEDIASTINUM: The heart is normal in size without pericardial effusion.  Coronary artery calcifications are identified.  LUNGS/PLEURA: There is a right pleural effusion. There are bibasilar pulmonary infiltrates.  These may represent atelectasis. LYMPH NODES: Thoracic lymph nodes are not enlarged. ABDOMEN:  LIVER: No hepatomegaly.  Smooth surface contour.  Normal attenuation.  There is a 2.8 cm lucency posteriorly in the right lobe of the liver.  This most likely represents hepatic cyst.  BILE DUCTS: No intrahepatic or extrahepatic biliary ductal dilatation.  GALLBLADDER: There are gallstones in the neck of the gallbladder. STOMACH: No abnormalities identified.  PANCREAS: No masses or ductal dilatation.  SPLEEN: No splenomegaly or focal splenic lesion.  ADRENAL GLANDS: No thickening or nodules.  KIDNEYS AND URETERS: Kidneys are normal in size and location.  No renal or ureteral calculi.  There is a 2.2 cm cyst on the left kidney.  PELVIS:  BLADDER: No abnormalities identified.  REPRODUCTIVE ORGANS: No abnormalities identified.  BOWEL: There is a duodenal diverticulum. VESSELS: No abnormalities identified.  Abdominal aorta is normal in caliber.  PERITONEUM/RETROPERITONEUM/LYMPH NODES: No free fluid.  No pneumoperitoneum. No lymphadenopathy.  ABDOMINAL WALL: No abnormalities identified. SOFT TISSUES: No abnormalities identified.  BONES: There are acute fractures of the right wrist series 6, seventh, eighth and ninth ribs.  The eighth and ninth ribs are fractured in multiple locations. THORACIC SPINE: The alignment is anatomic.  There is no fracture or traumatic subluxation. The vertebral body heights are well maintained.  There is diffuse intervertebral disc space narrowing.  This is most pronounced at the thoracolumbar junction with anterior osteophytes. The paravertebral soft tissues are within normal limits.  LUMBAR SPINE: The alignment is anatomic.   There is no fracture or traumatic subluxation. The vertebral body heights are well maintained.  There is diffuse intervertebral disc space narrowing with vacuum discs.  There is bony fusion of L4 on L5. The paravertebral soft tissues are within normal limits.      Acute fractures of the right posterior sixth, seventh, eighth and ninth ribs with the eighth and ninth ribs fractured in multiple locations. Bibasilar pulmonary infiltrates with right pleural effusion. Cholelithiasis. Enlargement of the right lobe of the thyroid.  This is best evaluated with ultrasound. Diffuse degenerative changes of the thoracic and lumbosacral spines. Signed by Rio Gross MD    CT lumbar spine wo IV contrast    Result Date: 2/12/2025  STUDY: CT Chest, Abdomen, and Pelvis without IV Contrast, CT Thoracic Spine and Lumbar Spine without IV; 2/12/2025 12:22 PM. INDICATION: Right rib pain with midline thoracic and lumbar spine pain; Status post fall on Monday. COMPARISON: Pelvis radiograph 7/9/2024.  US renal 12/27/2021. ACCESSION NUMBER(S): [id number], [id number], [id number] ORDERING CLINICIAN: HERBIE ZULETA TECHNIQUE: CT of the chest, abdomen, and pelvis was performed.  Contiguous axial images were obtained at 3 mm slice thickness through the chest, abdomen, and pelvis.  Coronal and sagittal reconstructions at 3 mm slice thickness were performed.  No intravenous contrast was administered.  Please note that spinal images were generated from the original CT abdomen and pelvis imaging. FINDINGS: Please note that the evaluation of vessels, lymph nodes and organs is limited without intravenous contrast. CHEST: There is enlargement of the right lobe of the thyroid.  This is best evaluated with ultrasound. MEDIASTINUM: The heart is normal in size without pericardial effusion.  Coronary artery calcifications are identified.  LUNGS/PLEURA: There is a right pleural effusion. There are bibasilar pulmonary infiltrates.  These may represent  atelectasis. LYMPH NODES: Thoracic lymph nodes are not enlarged. ABDOMEN:  LIVER: No hepatomegaly.  Smooth surface contour.  Normal attenuation.  There is a 2.8 cm lucency posteriorly in the right lobe of the liver.  This most likely represents hepatic cyst.  BILE DUCTS: No intrahepatic or extrahepatic biliary ductal dilatation.  GALLBLADDER: There are gallstones in the neck of the gallbladder. STOMACH: No abnormalities identified.  PANCREAS: No masses or ductal dilatation.  SPLEEN: No splenomegaly or focal splenic lesion.  ADRENAL GLANDS: No thickening or nodules.  KIDNEYS AND URETERS: Kidneys are normal in size and location.  No renal or ureteral calculi.  There is a 2.2 cm cyst on the left kidney.  PELVIS:  BLADDER: No abnormalities identified.  REPRODUCTIVE ORGANS: No abnormalities identified.  BOWEL: There is a duodenal diverticulum. VESSELS: No abnormalities identified.  Abdominal aorta is normal in caliber.  PERITONEUM/RETROPERITONEUM/LYMPH NODES: No free fluid.  No pneumoperitoneum. No lymphadenopathy.  ABDOMINAL WALL: No abnormalities identified. SOFT TISSUES: No abnormalities identified.  BONES: There are acute fractures of the right wrist series 6, seventh, eighth and ninth ribs.  The eighth and ninth ribs are fractured in multiple locations. THORACIC SPINE: The alignment is anatomic.  There is no fracture or traumatic subluxation. The vertebral body heights are well maintained.  There is diffuse intervertebral disc space narrowing.  This is most pronounced at the thoracolumbar junction with anterior osteophytes. The paravertebral soft tissues are within normal limits.  LUMBAR SPINE: The alignment is anatomic.  There is no fracture or traumatic subluxation. The vertebral body heights are well maintained.  There is diffuse intervertebral disc space narrowing with vacuum discs.  There is bony fusion of L4 on L5. The paravertebral soft tissues are within normal limits.      Acute fractures of the right  posterior sixth, seventh, eighth and ninth ribs with the eighth and ninth ribs fractured in multiple locations. Bibasilar pulmonary infiltrates with right pleural effusion. Cholelithiasis. Enlargement of the right lobe of the thyroid.  This is best evaluated with ultrasound. Diffuse degenerative changes of the thoracic and lumbosacral spines. Signed by Rio Gross MD    ECG 12 lead    Result Date: 2/12/2025  Sinus rhythm Prolonged OK interval Right bundle branch block    Scheduled medications:  acetaminophen, 650 mg, oral, q6h  dicyclomine, 20 mg, oral, Before meals & nightly  enoxaparin, 30 mg, subcutaneous, q24h  ibuprofen, 800 mg, oral, q6h CHRISTINA  lidocaine, 1 patch, transdermal, Daily  losartan, 100 mg, oral, Daily  [START ON 2/13/2025] pantoprazole, 40 mg, oral, Daily before breakfast   Or  [START ON 2/13/2025] pantoprazole, 40 mg, intravenous, Daily before breakfast  sertraline, 100 mg, oral, Daily  triamterene-hydrochlorothiazid, 1 tablet, oral, Daily      Continuous medications:     PRN medications:  PRN medications: melatonin, morphine, morphine, ondansetron, polyethylene glycol     Past Medical History  He has a past medical history of Hypertension and Renal disorder.    Surgical History  He has a past surgical history that includes Other surgical history (06/30/2020) and Other surgical history (06/30/2020).     Social History  He reports that he has never smoked. He has never used smokeless tobacco. He reports that he does not drink alcohol. No history on file for drug use.    Family History  No family history on file.    Allergies  Sulfamethoxazole-trimethoprim    Code Status  Full Code     Review of Systems   Constitutional:  Positive for activity change. Negative for chills, fatigue and fever.   Respiratory:  Negative for cough, chest tightness, shortness of breath and wheezing.    Cardiovascular:  Negative for chest pain, palpitations and leg swelling.   Gastrointestinal:  Negative for abdominal pain,  constipation, diarrhea, nausea and vomiting.   Genitourinary:  Negative for flank pain and hematuria.   Musculoskeletal:  Negative for back pain and joint swelling.        Right back/rib pain   Skin:  Negative for rash and wound.   Neurological:  Negative for tremors, syncope, weakness and headaches.       Last Recorded Vitals  /69   Pulse 56   Temp 36.8 °C (98.3 °F) (Temporal)   Resp 15   Wt 92.5 kg (204 lb)   SpO2 95%      Physical Exam:  Vital signs and nursing notes reviewed.   Constitutional: Pleasant and cooperative. Laying in bed in no acute distress. Conversant.   Skin: Warm and dry; no obvious lesions, rashes, pallor, or jaundice.   Eyes: EOMI. Anicteric sclera.   ENT: Mucous membranes moist; no obvious injury or deformity appreciated.   Head and Neck: Normocephalic, atraumatic. ROM preserved. Trachea midline. No appreciable JVD.   Respiratory: Nonlabored on RA. Lungs clear to auscultation bilaterally without obvious adventitious sounds. Chest rise is equal.  Cardiovascular: RRR. No gross murmur, gallop, or rub. Extremities are warm and well-perfused with good capillary refill (< 3 seconds). No chest wall tenderness.   GI: Abdomen soft, nontender, nondistended. No obvious organomegaly appreciated. Bowel sounds are present.  : No CVA tenderness.   MSK: Bruising noted under right armpit with tenderness in right-sided chest wall and lateral mid thoracic/upper lumbar back. No limitations to AROM/PROM appreciated.   Extremities: No cyanosis, edema, or clubbing evident. Neurovascularly intact.   Neuro: A&Ox3. CN 2-12 grossly intact. Able to respond to questions appropriately and clearly. No acute focal neurologic deficits appreciated.  Psych: Appropriate mood and behavior.    Assessment/Plan     89 y.o. male with PMHx s/f HTN, HLD, CKD stage IIIb, recurrent MDD, prediabetes, ED presenting with right back/rib pain.    Acute fracture of right posterior sixth and ninth ribs after fall  -scheduled  tylenol and dilaudid 1 mg q6h  -NSAIDs to be avoided with his renal clearance <30 ml/min  -can consider naproxen 250 mg q12h in pain control is not enough with tylenol + opioids  -lidocaine patch  -morphine PRN  -aggressive incentive spirometry  -trauma surgery consult    HTN  -BP stable, controlled  -Continue home losartan, triamterene-hydrochlorothiazide    CKD stage IIIb  -RFP at baseline  -continue to trend while admitted    Chronic constipation  -continue home bentyl    Recurrent MDD  -Continue home Zoloft    Diet: Regular  DVT Prophylaxis: Lovenox SQ  Code Status:   Case Discussed With: ED provider  Additional Sources Reviewed: PCP note    Anticipated Length of Stay (LOS): Patient will require 2+ midnights     Dany Cuello PA-C    Dragon dictation software was used to dictate this note and thus there may be minor errors in translation/transcription including garbled speech or misspellings. Please contact for clarification if needed.

## 2025-02-12 NOTE — PROGRESS NOTES
Luther Silverio is a 89 y.o. male admitted for Fall, initial encounter. Pharmacy reviewed the patient's [id number] medications and allergies for accuracy.    The list below reflects the PTA list prior to pharmacy medication history. A summary a changes to the PTA medication list has been listed below. Please review each medication in order reconciliation for additional clarification and justification.    Source of information: t2p    Medications added:    Medications modified:  Bentyl 20mg --> added prn    Medications to be removed:  Bactroban ointment     Medications of concern:      Prior to Admission Medications   Prescriptions Last Dose Informant Patient Reported? Taking?   dicyclomine (Bentyl) 20 mg tablet   Yes Yes   Sig: Take 1 tablet (20 mg) by mouth 4 times a day before meals.   losartan (Cozaar) 100 mg tablet   Yes No   Sig: Take 1 tablet (100 mg) by mouth once daily.   mupirocin (Bactroban) 2 % ointment   No No   Sig: Apply 1 Application topically 3 times a day.   sertraline (Zoloft) 100 mg tablet   Yes No   Sig: Take 1 tablet (100 mg) by mouth once daily.   triamterene-hydrochlorothiazid (Maxzide-25) 37.5-25 mg tablet   Yes Yes   Sig: Take 1 tablet by mouth once daily.      Facility-Administered Medications: None       SONAL ADHIKARI

## 2025-02-13 LAB
ALBUMIN SERPL BCP-MCNC: 3.4 G/DL (ref 3.4–5)
ALP SERPL-CCNC: 63 U/L (ref 33–136)
ALT SERPL W P-5'-P-CCNC: 11 U/L (ref 10–52)
ANION GAP SERPL CALC-SCNC: 10 MMOL/L (ref 10–20)
AST SERPL W P-5'-P-CCNC: 15 U/L (ref 9–39)
ATRIAL RATE: 57 BPM
BILIRUB SERPL-MCNC: 0.7 MG/DL (ref 0–1.2)
BUN SERPL-MCNC: 36 MG/DL (ref 6–23)
CALCIUM SERPL-MCNC: 8.2 MG/DL (ref 8.6–10.3)
CHLORIDE SERPL-SCNC: 107 MMOL/L (ref 98–107)
CO2 SERPL-SCNC: 23 MMOL/L (ref 21–32)
CREAT SERPL-MCNC: 1.65 MG/DL (ref 0.5–1.3)
EGFRCR SERPLBLD CKD-EPI 2021: 37 ML/MIN/1.73M*2
ERYTHROCYTE [DISTWIDTH] IN BLOOD BY AUTOMATED COUNT: 14.2 % (ref 11.5–14.5)
GLUCOSE SERPL-MCNC: 118 MG/DL (ref 74–99)
HCT VFR BLD AUTO: 28.9 % (ref 41–52)
HGB BLD-MCNC: 9.7 G/DL (ref 13.5–17.5)
MCH RBC QN AUTO: 29.8 PG (ref 26–34)
MCHC RBC AUTO-ENTMCNC: 33.6 G/DL (ref 32–36)
MCV RBC AUTO: 89 FL (ref 80–100)
NRBC BLD-RTO: 0 /100 WBCS (ref 0–0)
P AXIS: 2 DEGREES
PLATELET # BLD AUTO: 98 X10*3/UL (ref 150–450)
POTASSIUM SERPL-SCNC: 4.6 MMOL/L (ref 3.5–5.3)
PR INTERVAL: 267 MS
PROT SERPL-MCNC: 6 G/DL (ref 6.4–8.2)
Q ONSET: 252 MS
QRS COUNT: 9 BEATS
QRS DURATION: 151 MS
QT INTERVAL: 463 MS
QTC CALCULATION(BAZETT): 451 MS
QTC FREDERICIA: 455 MS
R AXIS: 33 DEGREES
RBC # BLD AUTO: 3.25 X10*6/UL (ref 4.5–5.9)
SODIUM SERPL-SCNC: 135 MMOL/L (ref 136–145)
T AXIS: 8 DEGREES
T OFFSET: 484 MS
VENTRICULAR RATE: 57 BPM
WBC # BLD AUTO: 4.3 X10*3/UL (ref 4.4–11.3)

## 2025-02-13 PROCEDURE — 99233 SBSQ HOSP IP/OBS HIGH 50: CPT | Performed by: INTERNAL MEDICINE

## 2025-02-13 PROCEDURE — 97166 OT EVAL MOD COMPLEX 45 MIN: CPT | Mod: GO

## 2025-02-13 PROCEDURE — 2500000005 HC RX 250 GENERAL PHARMACY W/O HCPCS: Performed by: EMERGENCY MEDICINE

## 2025-02-13 PROCEDURE — 97530 THERAPEUTIC ACTIVITIES: CPT | Mod: GP

## 2025-02-13 PROCEDURE — 80053 COMPREHEN METABOLIC PANEL: CPT

## 2025-02-13 PROCEDURE — 2500000004 HC RX 250 GENERAL PHARMACY W/ HCPCS (ALT 636 FOR OP/ED)

## 2025-02-13 PROCEDURE — 1200000002 HC GENERAL ROOM WITH TELEMETRY DAILY

## 2025-02-13 PROCEDURE — 99233 SBSQ HOSP IP/OBS HIGH 50: CPT | Performed by: SURGERY

## 2025-02-13 PROCEDURE — 97535 SELF CARE MNGMENT TRAINING: CPT | Mod: GO

## 2025-02-13 PROCEDURE — 2500000001 HC RX 250 WO HCPCS SELF ADMINISTERED DRUGS (ALT 637 FOR MEDICARE OP): Performed by: SURGERY

## 2025-02-13 PROCEDURE — 85027 COMPLETE CBC AUTOMATED: CPT

## 2025-02-13 PROCEDURE — 97161 PT EVAL LOW COMPLEX 20 MIN: CPT | Mod: GP

## 2025-02-13 PROCEDURE — 2500000002 HC RX 250 W HCPCS SELF ADMINISTERED DRUGS (ALT 637 FOR MEDICARE OP, ALT 636 FOR OP/ED)

## 2025-02-13 PROCEDURE — 36415 COLL VENOUS BLD VENIPUNCTURE: CPT

## 2025-02-13 PROCEDURE — 2500000001 HC RX 250 WO HCPCS SELF ADMINISTERED DRUGS (ALT 637 FOR MEDICARE OP)

## 2025-02-13 RX ORDER — OXYCODONE HYDROCHLORIDE 5 MG/1
5 TABLET ORAL 3 TIMES DAILY
Status: DISCONTINUED | OUTPATIENT
Start: 2025-02-13 | End: 2025-02-15 | Stop reason: HOSPADM

## 2025-02-13 RX ORDER — OXYCODONE HYDROCHLORIDE 5 MG/1
7.5 TABLET ORAL EVERY 4 HOURS PRN
Status: DISCONTINUED | OUTPATIENT
Start: 2025-02-13 | End: 2025-02-15 | Stop reason: HOSPADM

## 2025-02-13 RX ORDER — OXYCODONE HYDROCHLORIDE 5 MG/1
5 TABLET ORAL EVERY 4 HOURS PRN
Status: DISCONTINUED | OUTPATIENT
Start: 2025-02-13 | End: 2025-02-15 | Stop reason: HOSPADM

## 2025-02-13 RX ADMIN — ACETAMINOPHEN 650 MG: 325 TABLET ORAL at 13:50

## 2025-02-13 RX ADMIN — ACETAMINOPHEN 650 MG: 325 TABLET ORAL at 02:24

## 2025-02-13 RX ADMIN — PANTOPRAZOLE SODIUM 40 MG: 40 TABLET, DELAYED RELEASE ORAL at 06:21

## 2025-02-13 RX ADMIN — METHOCARBAMOL TABLETS 500 MG: 500 TABLET, COATED ORAL at 13:50

## 2025-02-13 RX ADMIN — TRIAMTERENE AND HYDROCHLOROTHIAZIDE 1 TABLET: 37.5; 25 TABLET ORAL at 08:42

## 2025-02-13 RX ADMIN — DICYCLOMINE HYDROCHLORIDE 20 MG: 10 CAPSULE ORAL at 10:20

## 2025-02-13 RX ADMIN — OXYCODONE HYDROCHLORIDE 5 MG: 5 TABLET ORAL at 15:49

## 2025-02-13 RX ADMIN — METHOCARBAMOL TABLETS 500 MG: 500 TABLET, COATED ORAL at 06:21

## 2025-02-13 RX ADMIN — ENOXAPARIN SODIUM 30 MG: 30 INJECTION SUBCUTANEOUS at 13:50

## 2025-02-13 RX ADMIN — HYDROMORPHONE HYDROCHLORIDE 1 MG: 2 TABLET ORAL at 10:20

## 2025-02-13 RX ADMIN — DICYCLOMINE HYDROCHLORIDE 20 MG: 10 CAPSULE ORAL at 06:21

## 2025-02-13 RX ADMIN — DICYCLOMINE HYDROCHLORIDE 20 MG: 10 CAPSULE ORAL at 15:49

## 2025-02-13 RX ADMIN — ACETAMINOPHEN 650 MG: 325 TABLET ORAL at 22:41

## 2025-02-13 RX ADMIN — LOSARTAN POTASSIUM 100 MG: 50 TABLET, FILM COATED ORAL at 08:42

## 2025-02-13 RX ADMIN — LIDOCAINE 4% 1 PATCH: 40 PATCH TOPICAL at 08:41

## 2025-02-13 RX ADMIN — ACETAMINOPHEN 650 MG: 325 TABLET ORAL at 08:42

## 2025-02-13 RX ADMIN — SERTRALINE HYDROCHLORIDE 100 MG: 50 TABLET ORAL at 08:42

## 2025-02-13 RX ADMIN — DICYCLOMINE HYDROCHLORIDE 20 MG: 10 CAPSULE ORAL at 22:42

## 2025-02-13 RX ADMIN — METHOCARBAMOL TABLETS 500 MG: 500 TABLET, COATED ORAL at 22:42

## 2025-02-13 RX ADMIN — HYDROMORPHONE HYDROCHLORIDE 1 MG: 2 TABLET ORAL at 03:48

## 2025-02-13 RX ADMIN — OXYCODONE HYDROCHLORIDE 5 MG: 5 TABLET ORAL at 22:41

## 2025-02-13 ASSESSMENT — COGNITIVE AND FUNCTIONAL STATUS - GENERAL
STANDING UP FROM CHAIR USING ARMS: A LITTLE
MOVING TO AND FROM BED TO CHAIR: A LITTLE
TOILETING: A LITTLE
MOBILITY SCORE: 6
DAILY ACTIVITIY SCORE: 20
CLIMB 3 TO 5 STEPS WITH RAILING: A LITTLE
TURNING FROM BACK TO SIDE WHILE IN FLAT BAD: TOTAL
TOILETING: A LITTLE
DAILY ACTIVITIY SCORE: 12
CLIMB 3 TO 5 STEPS WITH RAILING: A LOT
MOVING FROM LYING ON BACK TO SITTING ON SIDE OF FLAT BED WITH BEDRAILS: A LITTLE
MOVING FROM LYING ON BACK TO SITTING ON SIDE OF FLAT BED WITH BEDRAILS: TOTAL
CLIMB 3 TO 5 STEPS WITH RAILING: A LOT
DRESSING REGULAR UPPER BODY CLOTHING: A LOT
EATING MEALS: A LITTLE
WALKING IN HOSPITAL ROOM: A LITTLE
DRESSING REGULAR LOWER BODY CLOTHING: A LITTLE
PERSONAL GROOMING: A LITTLE
WALKING IN HOSPITAL ROOM: A LITTLE
CLIMB 3 TO 5 STEPS WITH RAILING: TOTAL
HELP NEEDED FOR BATHING: A LOT
DRESSING REGULAR UPPER BODY CLOTHING: A LITTLE
WALKING IN HOSPITAL ROOM: TOTAL
MOVING TO AND FROM BED TO CHAIR: TOTAL
DRESSING REGULAR UPPER BODY CLOTHING: A LITTLE
TURNING FROM BACK TO SIDE WHILE IN FLAT BAD: A LITTLE
DRESSING REGULAR LOWER BODY CLOTHING: A LITTLE
PERSONAL GROOMING: A LITTLE
HELP NEEDED FOR BATHING: A LITTLE
DRESSING REGULAR UPPER BODY CLOTHING: A LITTLE
MOVING FROM LYING ON BACK TO SITTING ON SIDE OF FLAT BED WITH BEDRAILS: A LITTLE
EATING MEALS: A LITTLE
PERSONAL GROOMING: A LITTLE
WALKING IN HOSPITAL ROOM: A LITTLE
MOBILITY SCORE: 17
TURNING FROM BACK TO SIDE WHILE IN FLAT BAD: A LITTLE
MOBILITY SCORE: 18
HELP NEEDED FOR BATHING: A LITTLE
DAILY ACTIVITIY SCORE: 18
STANDING UP FROM CHAIR USING ARMS: A LITTLE
TURNING FROM BACK TO SIDE WHILE IN FLAT BAD: A LITTLE
DRESSING REGULAR LOWER BODY CLOTHING: TOTAL
STANDING UP FROM CHAIR USING ARMS: A LITTLE
TOILETING: A LITTLE
DAILY ACTIVITIY SCORE: 19
MOVING TO AND FROM BED TO CHAIR: A LITTLE
HELP NEEDED FOR BATHING: A LITTLE
MOBILITY SCORE: 18
DRESSING REGULAR LOWER BODY CLOTHING: A LITTLE
MOVING TO AND FROM BED TO CHAIR: A LITTLE
TOILETING: TOTAL
STANDING UP FROM CHAIR USING ARMS: TOTAL

## 2025-02-13 ASSESSMENT — PAIN SCALES - GENERAL
PAINLEVEL_OUTOF10: 0 - NO PAIN
PAINLEVEL_OUTOF10: 3
PAINLEVEL_OUTOF10: 3

## 2025-02-13 ASSESSMENT — ACTIVITIES OF DAILY LIVING (ADL)
BATHING_ASSISTANCE: MAXIMAL
HOME_MANAGEMENT_TIME_ENTRY: 10
LACK_OF_TRANSPORTATION: NO
ADL_ASSISTANCE: INDEPENDENT
ADL_ASSISTANCE: INDEPENDENT

## 2025-02-13 ASSESSMENT — ENCOUNTER SYMPTOMS
COUGH: 0
NAUSEA: 0
VOMITING: 0
CONFUSION: 0
FEVER: 0
ABDOMINAL DISTENTION: 0
SHORTNESS OF BREATH: 0

## 2025-02-13 ASSESSMENT — PAIN - FUNCTIONAL ASSESSMENT
PAIN_FUNCTIONAL_ASSESSMENT: 0-10

## 2025-02-13 NOTE — CARE PLAN
The patient's goals for the shift include  maintain safety    The clinical goals for the shift include  be free from falls or injury

## 2025-02-13 NOTE — PROGRESS NOTES
Physical Therapy    Physical Therapy Evaluation    Patient Name: Luther Silverio  MRN: [id number]  Today's Date: 2/13/2025   Time Calculation  Start Time: 1135  Stop Time: 1204  Time Calculation (min): 29 min  3311/3311-A    Assessment/Plan   PT Assessment  PT Assessment Results: Decreased strength, Impaired balance, Decreased mobility, Impaired hearing, Pain  Rehab Prognosis: Good  Barriers to Discharge Home: Caregiver assistance, Physical needs  Caregiver Assistance: Caregiver assistance needed per identified barriers - however, level of patient's required assistance exceeds assistance available at home  Physical Needs: Stair navigation into home limited by function/safety, Ambulating household distances limited by function/safety, 24hr mobility assistance needed, Intermittent ADL assistance needed  Evaluation/Treatment Tolerance: Patient tolerated treatment well, Patient limited by pain  Medical Staff Made Aware: Yes  End of Session Communication: Bedside nurse  Assessment Comment: Patient presents with moderate R rib pain, decreased strength and balance, requiring MOD A of 2 for transfers. He would benefit from continued PT with recommendation for HIGH INTENSITY rehab to return to prior level of function.  End of Session Patient Position: Up in chair, Alarm on  IP OR SWING BED PT PLAN  Inpatient or Swing Bed: Inpatient     02/13/25 1135   PT Plan   Treatment/Interventions Bed mobility;Transfer training;Gait training;Balance training;Strengthening;Therapeutic exercise;Therapeutic activity;Home exercise program   PT Plan Ongoing PT   PT Frequency 5 times per week   PT Discharge Recommendations High intensity level of continued care   Equipment Recommended upon Discharge   (TBD - has SPC at home.)   PT Recommended Transfer Status Assist x2   PT - OK to Discharge Yes  (To next level of care when medically cleared.)       Subjective     Current Problem:  1. Fall, initial encounter          Patient Active Problem List    Diagnosis    Bradycardia    Age-related incipient cataract of right eye    Bilateral swelling of feet    Chronic pain of right knee    Decreased hearing of both ears    Essential hypertension    Hypertensive kidney disease with stage 3b chronic kidney disease (Multi)    Localized osteoarthritis of right knee    Mixed hyperlipidemia    Obstructive sleep apnea    Prediabetes    Premature atrial contractions    Recurrent major depressive disorder, in partial remission (CMS-HCC)    Right bundle branch block (RBBB) on electrocardiography    Stage 3b chronic kidney disease (Multi)    Thrombocytopenia (CMS-[facility])    Vitamin B12 deficiency    Vitamin D deficiency    Fall, initial encounter    Multiple fractures of ribs, right side, initial encounter for closed fracture       General Visit Information:  General  Reason for Referral: Fall with R posterior rib fx #6-9. Impaired mobility.  Referred By: Dany Cuello PA-C  Past Medical History Relevant to Rehab: PMHx: HTN, CKD, ED. (89 y/o male admitted post fall after tripping and fracturing R ribs # 6-9. Hemoglobin 9.7.)  Family/Caregiver Present: No  Co-Treatment: OT  Co-Treatment Reason: To optimize safe functional mobility with consideration of current medical status.  Prior to Session Communication: Bedside nurse  Patient Position Received: Bed, 2 rail up, Alarm off, not on at start of session  General Comment: Patient alert, requesting to get to the bathroom. Motivated.    Home Living:  Home Living  Type of Home: House  Lives With: Spouse  Home Adaptive Equipment: Cane  Home Layout: One level  Home Access: Stairs to enter with rails  Entrance Stairs-Rails:  (1)  Entrance Stairs-Number of Steps: 2  Bathroom Shower/Tub: Tub/shower unit  Home Living Comments: Workshop in basement (does not have to access).    Prior Level of Function:  Prior Function Per Pt/Caregiver Report  Level of [place]: Independent with ADLs and functional transfers, Independent with homemaking with  ambulation  ADL Assistance: Independent  Homemaking Assistance: Independent  Ambulatory Assistance: Independent (With SPC)  Prior Function Comments: (+) recent fall only one in past 6 months. +drives.    Precautions:  Precautions  Precautions Comment: Fall precautions. R sided rib pain. [place].    Vital Signs:  Vital Signs  Vitals Session: During PT  Heart Rate: 58  Heart Rate Source: Monitor  SpO2: 95 % (87-95% with mobility on room air - nurse notified.)  Patient Position: Sitting  Objective     Pain:  Pain Assessment  Pain Assessment:  (Patient presents with moderate R posterior rib pain with movement, moaning and requesting to have additional time prior to initiation of mobility. Notes at rest 0/10 pain.)    Cognition:  Cognition  Overall Cognitive Status: Within Functional Limits  Attention: Within Functional Limits  Safety/Judgement: Within Functional Limits  Insight: Within function limits  Processing Speed: Delayed    General Assessments:  General Observation  General Observation: Patient instructed to pursed lip exhale with pain while attempting to sit up with HOB elevated as well as with sit > stand. Required elevation of bed to achieve full upright stand/ placed BSC over toilet due to pain levels. Instructed pt to weight shift L and push up with L hand to reduce pain - good patient follow through.  VC provided for increased step length R LE during gait. Pt reports new onset back discomfort limits his ability to walk.   Activity Tolerance  Endurance: Endurance does not limit participation in activity  Sensation  Light Touch: No apparent deficits  Strength  Strength Comments: B LE grossly 4-/5.  Perception  Inattention/Neglect: Appears intact  Coordination  Movements are Fluid and Coordinated: Yes (With exception of decreased pace due to pain.)     Static Sitting Balance  Static Sitting-Balance Support: Feet supported, Bilateral upper extremity supported  Static Sitting-Level of Assistance: Close  supervision  Static Standing Balance  Static Standing-Balance Support: Bilateral upper extremity supported  Static Standing-Level of Assistance: Minimum assistance (of 2 @ WW)    Functional Assessments:     Bed Mobility  Bed Mobility:  (Supine > sit HOB elevated, MOD A of 2.)  Transfers  Transfer:  (Sit <> stand MOD A of 2 on second attempt from elevated bed.)  Ambulation/Gait Training  Ambulation/Gait Training Performed:  (Ambulated 15 feet x 2 with WW and MIN A of 2 with step to gait initially.)  Stairs  Stairs: No (Too much pain.)       Extremity/Trunk Assessments:        RLE   RLE : Within Functional Limits  LLE   LLE : Within Functional Limits    Outcome Measures:     [facility] Basic Mobility  Turning from your back to your side while in a flat bed without using bedrails: Total  Moving from lying on your back to sitting on the side of a flat bed without using bedrails: Total  Moving to and from bed to chair (including a wheelchair): Total  Standing up from a chair using your arms (e.g. wheelchair or bedside chair): Total  To walk in hospital room: Total  Climbing 3-5 steps with railing: Total  Basic Mobility - Total Score: 6                    FSS-ICU  Ambulation: Walks <50 feet with any assistance x1 or walks any distance with assistance x2 people  Rolling: Total assistance (performs 25% or requires another person)  Sitting: Supervision or set-up only  Transfer Sit-to-Stand: Total assistance (performs 25% or requires another person)  Transfer Supine-to-Sit: Total assistance (performs 25% or requires another person)  Total Score: 9                                        Goals:  Encounter Problems       Encounter Problems (Active)       Pain - Adult          To improve strength, balance, and decreased pain with mobility:        Patient will demonstrate awareness of techniques to reduce R rib pain with functional mobility with <25% VC.       Start:  02/13/25    Expected End:  02/27/25            Patient will complete  supine <> sit and transfers with MIN A of 2.       Start:  02/13/25    Expected End:  02/27/25            Patient will ambulate 50 feet x 2 with WW and CGA of 1 with increased step length.        Start:  02/13/25    Expected End:  02/27/25                 Education Documentation  Body Mechanics, taught by Edith Kraft, PT at 2/13/2025  3:52 PM.  Learner: Patient  Readiness: Acceptance  Method: Explanation  Response: Verbalizes Understanding, Needs Reinforcement    Mobility Training, taught by Edith Kraft, PT at 2/13/2025  3:52 PM.  Learner: Patient  Readiness: Acceptance  Method: Explanation  Response: Verbalizes Understanding, Needs Reinforcement    Education Comments  No comments found.

## 2025-02-13 NOTE — PROGRESS NOTES
"    GENERAL SURGERY / TRAUMA PROGRESS NOTE    Patient: Luther Silverio  Room: 14/14-A    Age: 89 y.o.   Gender: male  Attending: Ced Norwood MD PhD    MRN: [id number]  Admission Date: 2/12/2025    PCP: Aleksandr Palm MD       Luther Silverio is a 89 y.o. male on day 1  of admission presenting with Fall, initial encounter.    SUBJECTIVE   Interval History:  He states as long as he stays still in bed, he does not have any pain.  However, when he rolls over in bed, his pain level is an 11 out of 10.  Tolerating diet.  Pulling 2000 on his incentive spirometer.  Apparently is not asking for his pain medication.    ROS  Review of Systems   Constitutional:  Negative for fever.   Respiratory:  Negative for cough and shortness of breath.    Cardiovascular:  Positive for chest pain.   Gastrointestinal:  Negative for abdominal distention, nausea and vomiting.   Psychiatric/Behavioral:  Negative for confusion.           OBJECTIVE   Last Recorded Vitals  Blood pressure 124/60, pulse 64, temperature 36 °C (96.8 °F), temperature source Temporal, resp. rate 16, height 1.803 m (5' 11\"), weight 89.2 kg (196 lb 10.4 oz), SpO2 91%.    Intake/Output last 3 Shifts:  No intake/output data recorded.    PHYSICAL EXAM  Physical Exam   Constitutional:       General: He is not in acute distress.     Appearance: He is not toxic-appearing.   HENT:      Head: Normocephalic and atraumatic.      Right Ear: External ear normal.      Left Ear: External ear normal.      Nose: Nose normal.      Mouth/Throat:      Mouth: Mucous membranes are moist.   Eyes:      General: No scleral icterus.     Pupils: Pupils are equal, round, and reactive to light.   Cardiovascular:      Rate and Rhythm: Normal rate and regular rhythm.      Pulses: Normal pulses.   Pulmonary:      Effort: Pulmonary effort is normal.      Breath sounds: Normal breath sounds.      Comments: Tenderness posterior right sided ribs.  No crepitus.  Some light bruising in the mid " axillary area.  Chest:      Chest wall: Tenderness present.   Abdominal:      General: There is no distension.      Palpations: Abdomen is soft.      Tenderness: There is no abdominal tenderness.   Musculoskeletal:         General: No swelling.      Cervical back: Neck supple. No tenderness.   Skin:     General: Skin is warm and dry.   Neurological:      General: No focal deficit present.      Mental Status: He is alert.     RESULTS   Labs  Results for orders placed or performed during the hospital encounter of 02/12/25 (from the past 24 hours)   CBC   Result Value Ref Range    WBC 4.3 (L) 4.4 - 11.3 x10*3/uL    nRBC 0.0 0.0 - 0.0 /100 WBCs    RBC 3.25 (L) 4.50 - 5.90 x10*6/uL    Hemoglobin 9.7 (L) 13.5 - 17.5 g/dL    Hematocrit 28.9 (L) 41.0 - 52.0 %    MCV 89 80 - 100 fL    MCH 29.8 26.0 - 34.0 pg    MCHC 33.6 32.0 - 36.0 g/dL    RDW 14.2 11.5 - 14.5 %    Platelets 98 (L) 150 - 450 x10*3/uL   Comprehensive metabolic panel   Result Value Ref Range    Glucose 118 (H) 74 - 99 mg/dL    Sodium 135 (L) 136 - 145 mmol/L    Potassium 4.6 3.5 - 5.3 mmol/L    Chloride 107 98 - 107 mmol/L    Bicarbonate 23 21 - 32 mmol/L    Anion Gap 10 10 - 20 mmol/L    Urea Nitrogen 36 (H) 6 - 23 mg/dL    Creatinine 1.65 (H) 0.50 - 1.30 mg/dL    eGFR 37 (L) >60 mL/min/1.73m*2    Calcium 8.2 (L) 8.6 - 10.3 mg/dL    Albumin 3.4 3.4 - 5.0 g/dL    Alkaline Phosphatase 63 33 - 136 U/L    Total Protein 6.0 (L) 6.4 - 8.2 g/dL    AST 15 9 - 39 U/L    Bilirubin, Total 0.7 0.0 - 1.2 mg/dL    ALT 11 10 - 52 U/L       Radiology Resutls  ECG 12 lead    Result Date: 2/13/2025  Sinus rhythm Prolonged NC interval Right bundle branch block See ED provider note for full interpretation and clinical correlation Confirmed by Porsha Banda ([id number]) on 2/13/2025 7:00:07 AM    CT chest abdomen pelvis wo IV contrast    Result Date: 2/12/2025  STUDY: CT Chest, Abdomen, and Pelvis without IV Contrast, CT Thoracic Spine and Lumbar Spine without IV; 2/12/2025 12:22  PM. INDICATION: Right rib pain with midline thoracic and lumbar spine pain; Status post fall on Monday. COMPARISON: Pelvis radiograph 7/9/2024.  US renal 12/27/2021. ACCESSION NUMBER(S): [id number], [id number], [id number] ORDERING CLINICIAN: HERBIE ZULETA TECHNIQUE: CT of the chest, abdomen, and pelvis was performed.  Contiguous axial images were obtained at 3 mm slice thickness through the chest, abdomen, and pelvis.  Coronal and sagittal reconstructions at 3 mm slice thickness were performed.  No intravenous contrast was administered.  Please note that spinal images were generated from the original CT abdomen and pelvis imaging. FINDINGS: Please note that the evaluation of vessels, lymph nodes and organs is limited without intravenous contrast. CHEST: There is enlargement of the right lobe of the thyroid.  This is best evaluated with ultrasound. MEDIASTINUM: The heart is normal in size without pericardial effusion.  Coronary artery calcifications are identified.  LUNGS/PLEURA: There is a right pleural effusion. There are bibasilar pulmonary infiltrates.  These may represent atelectasis. LYMPH NODES: Thoracic lymph nodes are not enlarged. ABDOMEN:  LIVER: No hepatomegaly.  Smooth surface contour.  Normal attenuation.  There is a 2.8 cm lucency posteriorly in the right lobe of the liver.  This most likely represents hepatic cyst.  BILE DUCTS: No intrahepatic or extrahepatic biliary ductal dilatation.  GALLBLADDER: There are gallstones in the neck of the gallbladder. STOMACH: No abnormalities identified.  PANCREAS: No masses or ductal dilatation.  SPLEEN: No splenomegaly or focal splenic lesion.  ADRENAL GLANDS: No thickening or nodules.  KIDNEYS AND URETERS: Kidneys are normal in size and location.  No renal or ureteral calculi.  There is a 2.2 cm cyst on the left kidney.  PELVIS:  BLADDER: No abnormalities identified.  REPRODUCTIVE ORGANS: No abnormalities identified.  BOWEL: There is a duodenal  diverticulum. VESSELS: No abnormalities identified.  Abdominal aorta is normal in caliber.  PERITONEUM/RETROPERITONEUM/LYMPH NODES: No free fluid.  No pneumoperitoneum. No lymphadenopathy.  ABDOMINAL WALL: No abnormalities identified. SOFT TISSUES: No abnormalities identified.  BONES: There are acute fractures of the right wrist series 6, seventh, eighth and ninth ribs.  The eighth and ninth ribs are fractured in multiple locations. THORACIC SPINE: The alignment is anatomic.  There is no fracture or traumatic subluxation. The vertebral body heights are well maintained.  There is diffuse intervertebral disc space narrowing.  This is most pronounced at the thoracolumbar junction with anterior osteophytes. The paravertebral soft tissues are within normal limits.  LUMBAR SPINE: The alignment is anatomic.  There is no fracture or traumatic subluxation. The vertebral body heights are well maintained.  There is diffuse intervertebral disc space narrowing with vacuum discs.  There is bony fusion of L4 on L5. The paravertebral soft tissues are within normal limits.      Acute fractures of the right posterior sixth, seventh, eighth and ninth ribs with the eighth and ninth ribs fractured in multiple locations. Bibasilar pulmonary infiltrates with right pleural effusion. Cholelithiasis. Enlargement of the right lobe of the thyroid.  This is best evaluated with ultrasound. Diffuse degenerative changes of the thoracic and lumbosacral spines. Signed by Rio Gross MD    CT thoracic spine wo IV contrast    Result Date: 2/12/2025  STUDY: CT Chest, Abdomen, and Pelvis without IV Contrast, CT Thoracic Spine and Lumbar Spine without IV; 2/12/2025 12:22 PM. INDICATION: Right rib pain with midline thoracic and lumbar spine pain; Status post fall on Monday. COMPARISON: Pelvis radiograph 7/9/2024.  US renal 12/27/2021. ACCESSION NUMBER(S): [id number], [id number], [id number] ORDERING CLINICIAN: HERBIE ZULETA TECHNIQUE: CT of the  chest, abdomen, and pelvis was performed.  Contiguous axial images were obtained at 3 mm slice thickness through the chest, abdomen, and pelvis.  Coronal and sagittal reconstructions at 3 mm slice thickness were performed.  No intravenous contrast was administered.  Please note that spinal images were generated from the original CT abdomen and pelvis imaging. FINDINGS: Please note that the evaluation of vessels, lymph nodes and organs is limited without intravenous contrast. CHEST: There is enlargement of the right lobe of the thyroid.  This is best evaluated with ultrasound. MEDIASTINUM: The heart is normal in size without pericardial effusion.  Coronary artery calcifications are identified.  LUNGS/PLEURA: There is a right pleural effusion. There are bibasilar pulmonary infiltrates.  These may represent atelectasis. LYMPH NODES: Thoracic lymph nodes are not enlarged. ABDOMEN:  LIVER: No hepatomegaly.  Smooth surface contour.  Normal attenuation.  There is a 2.8 cm lucency posteriorly in the right lobe of the liver.  This most likely represents hepatic cyst.  BILE DUCTS: No intrahepatic or extrahepatic biliary ductal dilatation.  GALLBLADDER: There are gallstones in the neck of the gallbladder. STOMACH: No abnormalities identified.  PANCREAS: No masses or ductal dilatation.  SPLEEN: No splenomegaly or focal splenic lesion.  ADRENAL GLANDS: No thickening or nodules.  KIDNEYS AND URETERS: Kidneys are normal in size and location.  No renal or ureteral calculi.  There is a 2.2 cm cyst on the left kidney.  PELVIS:  BLADDER: No abnormalities identified.  REPRODUCTIVE ORGANS: No abnormalities identified.  BOWEL: There is a duodenal diverticulum. VESSELS: No abnormalities identified.  Abdominal aorta is normal in caliber.  PERITONEUM/RETROPERITONEUM/LYMPH NODES: No free fluid.  No pneumoperitoneum. No lymphadenopathy.  ABDOMINAL WALL: No abnormalities identified. SOFT TISSUES: No abnormalities identified.  BONES: There are  acute fractures of the right wrist series 6, seventh, eighth and ninth ribs.  The eighth and ninth ribs are fractured in multiple locations. THORACIC SPINE: The alignment is anatomic.  There is no fracture or traumatic subluxation. The vertebral body heights are well maintained.  There is diffuse intervertebral disc space narrowing.  This is most pronounced at the thoracolumbar junction with anterior osteophytes. The paravertebral soft tissues are within normal limits.  LUMBAR SPINE: The alignment is anatomic.  There is no fracture or traumatic subluxation. The vertebral body heights are well maintained.  There is diffuse intervertebral disc space narrowing with vacuum discs.  There is bony fusion of L4 on L5. The paravertebral soft tissues are within normal limits.      Acute fractures of the right posterior sixth, seventh, eighth and ninth ribs with the eighth and ninth ribs fractured in multiple locations. Bibasilar pulmonary infiltrates with right pleural effusion. Cholelithiasis. Enlargement of the right lobe of the thyroid.  This is best evaluated with ultrasound. Diffuse degenerative changes of the thoracic and lumbosacral spines. Signed by Rio Gross MD    CT lumbar spine wo IV contrast    Result Date: 2/12/2025  STUDY: CT Chest, Abdomen, and Pelvis without IV Contrast, CT Thoracic Spine and Lumbar Spine without IV; 2/12/2025 12:22 PM. INDICATION: Right rib pain with midline thoracic and lumbar spine pain; Status post fall on Monday. COMPARISON: Pelvis radiograph 7/9/2024.  US renal 12/27/2021. ACCESSION NUMBER(S): [id number], [id number], [id number] ORDERING CLINICIAN: HERBIE ZULETA TECHNIQUE: CT of the chest, abdomen, and pelvis was performed.  Contiguous axial images were obtained at 3 mm slice thickness through the chest, abdomen, and pelvis.  Coronal and sagittal reconstructions at 3 mm slice thickness were performed.  No intravenous contrast was administered.  Please note that spinal images were  generated from the original CT abdomen and pelvis imaging. FINDINGS: Please note that the evaluation of vessels, lymph nodes and organs is limited without intravenous contrast. CHEST: There is enlargement of the right lobe of the thyroid.  This is best evaluated with ultrasound. MEDIASTINUM: The heart is normal in size without pericardial effusion.  Coronary artery calcifications are identified.  LUNGS/PLEURA: There is a right pleural effusion. There are bibasilar pulmonary infiltrates.  These may represent atelectasis. LYMPH NODES: Thoracic lymph nodes are not enlarged. ABDOMEN:  LIVER: No hepatomegaly.  Smooth surface contour.  Normal attenuation.  There is a 2.8 cm lucency posteriorly in the right lobe of the liver.  This most likely represents hepatic cyst.  BILE DUCTS: No intrahepatic or extrahepatic biliary ductal dilatation.  GALLBLADDER: There are gallstones in the neck of the gallbladder. STOMACH: No abnormalities identified.  PANCREAS: No masses or ductal dilatation.  SPLEEN: No splenomegaly or focal splenic lesion.  ADRENAL GLANDS: No thickening or nodules.  KIDNEYS AND URETERS: Kidneys are normal in size and location.  No renal or ureteral calculi.  There is a 2.2 cm cyst on the left kidney.  PELVIS:  BLADDER: No abnormalities identified.  REPRODUCTIVE ORGANS: No abnormalities identified.  BOWEL: There is a duodenal diverticulum. VESSELS: No abnormalities identified.  Abdominal aorta is normal in caliber.  PERITONEUM/RETROPERITONEUM/LYMPH NODES: No free fluid.  No pneumoperitoneum. No lymphadenopathy.  ABDOMINAL WALL: No abnormalities identified. SOFT TISSUES: No abnormalities identified.  BONES: There are acute fractures of the right wrist series 6, seventh, eighth and ninth ribs.  The eighth and ninth ribs are fractured in multiple locations. THORACIC SPINE: The alignment is anatomic.  There is no fracture or traumatic subluxation. The vertebral body heights are well maintained.  There is diffuse  intervertebral disc space narrowing.  This is most pronounced at the thoracolumbar junction with anterior osteophytes. The paravertebral soft tissues are within normal limits.  LUMBAR SPINE: The alignment is anatomic.  There is no fracture or traumatic subluxation. The vertebral body heights are well maintained.  There is diffuse intervertebral disc space narrowing with vacuum discs.  There is bony fusion of L4 on L5. The paravertebral soft tissues are within normal limits.      Acute fractures of the right posterior sixth, seventh, eighth and ninth ribs with the eighth and ninth ribs fractured in multiple locations. Bibasilar pulmonary infiltrates with right pleural effusion. Cholelithiasis. Enlargement of the right lobe of the thyroid.  This is best evaluated with ultrasound. Diffuse degenerative changes of the thoracic and lumbosacral spines. Signed by Rio Gross MD        ASSESSMENT / PLAN     Assessment & Plan  Fall, initial encounter    Multiple fractures of ribs, right side, initial encounter for closed fracture         PLAN  1.  Had a long discussion with the patient regarding his pain management.  Reviewed that he has some medications which will be scheduled to give him a baseline pain control, but other medications he must ask the nurses to provide when his pain level is above a 5.  He seems somewhat confused about how to asked the nurses for the oxycodone pain medication.  Will schedule oxycodone 5 mg 3 times a day to see if this helps with his pain.  Otherwise, he remains on multimodality pain management with lidocaine patches, scheduled Tylenol, Robaxin and as needed narcotics.  2.  Encouraged him to increase his physical activity.  He is only gotten out of bed once to walk to the bathroom today.  Reviewed that lack of mobility and lack of incentive spirometer use can lead to pneumonia which which is the biggest complication risk associated with rib fractures.  3.  PT/OT evaluations.  Will need to see  from their recommendations whether, or not, the patient is safe for discharge home, or might need skilled nursing facility placement for a while.  4.  Noted plans to be transferred to 3 E.  5.  No other traumatic issues identified on tertiary exam.      Kari Palomares MD, FACS  [facility] General Surgery  [address] [place] [address];   [address]  [place]  [address] [phone]

## 2025-02-13 NOTE — PROGRESS NOTES
Luther iSlverio is a 89 y.o. male on day 1 of admission presenting with Fall, initial encounter.    Subjective   Luther Silverio is a 89 y.o. male with PMHx s/f HTN, HLD, CKD stage IIIb, recurrent MDD, prediabetes, ED presenting with right back/rib pain.  He states he was grabbing something from the kitchen on Monday, tripped on something and fell backward on a doorknob and has bruising to right back rib area and is tender with any movement especially aggravated with coughing.  Denies hitting his head, LOC, being on blood thinners.  Denies lightheadedness, dizziness, neck pain, shortness of breath.     ED Course (Summary - please note all labs, imaging studies, and interventions noted below have been personally reviewed and/or interpreted on day of admission):   Vitals on presentation: 98.3 F, 60 bpm, 61 RR, 131/77, 95% on RA  Labs: CMP-glucose 121, BUN/creatinine 36/1.73, EGFR 35  INR/PT 1.2/13.2  CBC-WBC 4.3, hemoglobin 10.1, platelets 92, lymphocyte subset 0.73  EKG: sinus rhythm at 57 bpm, prolonged [place], right bundle branch block.  .  QTc 451.  Imaging: CT thoracic, lumbar, chest AP-Acute fractures of the right posterior sixth, seventh, eighth and ninth ribs with the eighth and ninth ribs fractured in multiple locations. Bibasilar pulmonary infiltrates with right pleural effusion. Cholelithiasis. Enlargement of the right lobe of the thyroid.  This is best evaluated with ultrasound. Diffuse degenerative changes of the thoracic and lumbosacral spines.  Interventions: Robaxin 500 mg, morphine 2 mg, Zofran 4 mg, admission further management     12-point ROS reviewed and found to be negative aside from aforementioned positives in HPI and/or noted in dedicated ROS section below.     2/13: Pain well controlled if he is not moving.        Objective     Constitutional: Pleasant and cooperative. Laying in bed in no acute distress. Conversant.   Skin: Warm and dry; no obvious lesions, rashes, pallor, or jaundice.  "  Eyes: EOMI. Anicteric sclera.   ENT: Mucous membranes moist; no obvious injury or deformity appreciated.   Head and Neck: Normocephalic, atraumatic. ROM preserved. Trachea midline. No appreciable JVD.   Respiratory: Nonlabored on RA. Lungs clear to auscultation bilaterally without obvious adventitious sounds. Chest rise is equal.  Cardiovascular: RRR. No gross murmur, gallop, or rub. Extremities are warm and well-perfused with good capillary refill (< 3 seconds). No chest wall tenderness.   GI: Abdomen soft, nontender, nondistended. No obvious organomegaly appreciated. Bowel sounds are present.  : No CVA tenderness.   MSK: Bruising noted under right armpit with tenderness in right-sided chest wall and lateral mid thoracic/upper lumbar back. No limitations to AROM/PROM appreciated.   Extremities: No cyanosis, edema, or clubbing evident. Neurovascularly intact.   Neuro: A&Ox3. CN 2-12 grossly intact. Able to respond to questions appropriately and clearly. No acute focal neurologic deficits appreciated.  Psych: Appropriate mood and behavior.    Last Recorded Vitals  Blood pressure (!) 135/109, pulse 60, temperature 35.3 °C (95.5 °F), temperature source Temporal, resp. rate 18, height 1.803 m (5' 11\"), weight 89.2 kg (196 lb 10.4 oz), SpO2 91%.  Intake/Output last 3 Shifts:  No intake/output data recorded.    Relevant Results            This patient currently has cardiac telemetry ordered; if you would like to modify or discontinue the telemetry order, click here to go to the orders activity to modify/discontinue the order.                 Assessment/Plan   Acute fracture of right posterior sixth and ninth ribs after fall  -pain control  -aggressive incentive spirometry  -trauma surgery consult     HTN  -BP stable, controlled  -Continue home losartan, triamterene-hydrochlorothiazide     CKD stage IIIb  -RFP at baseline  -continue to trend while admitted     Chronic constipation  -continue home bentyl     Recurrent " MDD  -Continue home Teofilo Norwood MD PhD

## 2025-02-13 NOTE — PROGRESS NOTES
02/13/25 1050   Discharge Planning   Living Arrangements Spouse/significant other   Support Systems Spouse/significant other   Assistance Needed Independent with cane   Type of Residence Private residence   Home or Post Acute Services In home services   Type of Home Care Services Home nursing visits;Home OT;Home PT   Expected Discharge Disposition Home H   Does the patient need discharge transport arranged? Yes   RoundTrip coordination needed? Yes   Has discharge transport been arranged? No   Financial Resource Strain   How hard is it for you to pay for the very basics like food, housing, medical care, and heating? Not hard   Housing Stability   In the last 12 months, was there a time when you were not able to pay the mortgage or rent on time? N   At any time in the past 12 months, were you homeless or living in a shelter (including now)? N   Transportation Needs   In the past 12 months, has lack of transportation kept you from medical appointments or from getting medications? no   In the past 12 months, has lack of transportation kept you from meetings, work, or from getting things needed for daily living? No     PCP is Aleksandr Palm MD. Patient is from home with his wife and with support from his granddaughter. Patient states he is independent with his cane, sponge bathing, while his granddaughter assists with cooking, cleaning and transportation. He states that a pantry door handle hit his back causing him to fall. He denies any other recent falls. His wife is independent herself but uses a walker and can only provide him with limited support. PT/ OT pending. Patient is open to HHC, but not SNF placement if recommended. He plans on returning home when medically ready. Denies any other home going needs at this time. TCC to follow.

## 2025-02-13 NOTE — NURSING NOTE
Report called to Brielle BERRY. All questions answered.     Pt to update family re: transfer to [id number].

## 2025-02-13 NOTE — PROGRESS NOTES
Occupational Therapy  Evaluation    Patient Name: Luther Silverio  MRN: [id number]  Today's Date: 2/13/2025  Time Calculation  Start Time: 1136  Stop Time: 1205  Time Calculation (min): 29 min    Current Problem:   1. Fall, initial encounter        OT order: OT eval and treat   Referred by: Dany Cuello PA-C  Reason for referral: Fall with R posterior rib fx #6-9. (ADLs, safety)  Past medical history related to rehab: PMHx: HTN, CKD, ED. (89 y/o male admitted post fall after tripping and fracturing R ribs # 6-9. Hemoglobin 9.7.)    Precautions:   Medical Precautions: Fall precautions  Precautions Comment: + rib fractures R side    ASSESSMENT  OT Assessment: OT eval completed. The patient is functioning below baseline for ADLs and mobility. can benefit from continued OT. Pt with Decreased ADL status, Decreased upper extremity strength, Decreased safe judgment during ADL, Decreased cognition, Decreased endurance, Decreased gross motor control, Decreased IADLs, Decreased functional mobility  Prognosis:    Barriers to discharge home: Caregiver assistance, Cognition needs, Physical needs           Tolerance:      PLAN  Frequency: 4 times per week  Treatment Interventions: ADL retraining, Functional transfer training, UE strengthening/ROM, Endurance training, Cognitive reorientation, Patient/family training, Equipment evaluation/education, Neuromuscular reeducation  Discharge Recommendations: High intensity level of continued care  OT OK to discharge: Yes    GENERAL VISIT INFORMATION   Start of session communication: Bedside nurse  End of session communication: Bedside nurse  Family/caregiver present: No  Caregiver feedback:    Co-Treatment: PT  Reason for co-treatment: to optimize safety and mobility, while focusing on discipline specific goals   Position Pt Received:  Bed, 2 rail up, Alarm off, not on at start of session  End of session position: Up in chair, Alarm on    SUBJECTIVE  Home Living:  Type of Home: House  Lives  With: Spouse  Home Adaptive Equipment: Cane  Home Layout: One level, Work area in basement  Home Access: Stairs to enter with rails  Entrance Stairs-Number of Steps: 2     Prior Level of Function:  ADL Assistance: Independent  Homemaking Assistance: Independent  Ambulatory Assistance: Independent (cane)  Prior Function Comments: +falls. +drives    IADL History:       Pain:  Assessment: 0-10  Score:    Type:    Location:  (Patient presents with moderate R posterior rib pain with movement, moaning and requesting to have additional time prior to initiation of mobility. Notes at rest 0/10 pain.)  Interventions:    Response to pain interventions:      OBJECTIVE  Vital Signs:  Vitals Session: During OT  SpO2: 95 % (87-95% on room air with all activity)  Patient Position: Sitting    Cognition:  Overall Cognitive Status: Within Functional Limits (oriented x4)  Processing Speed: Delayed             Current ADL function:   EATING:  Stand by     GROOMING: Minimal     BATHING: Maximal     UB DRESSING: Maximal     LB DRESSING: Maximal     TOILETING: Total    ADL comments:  Self care intervention provided. assisted pt into bathroom for toileting. seat riser obtained for ease of transition. pt needed max cues to safely navigate walker into bathroom. max cues for hand placement. pt attempted to perform own hygiene but was unable to reach, limited ROM in arms as it put too much strain on his ribs to twist and IR.    Activity Tolerance:  Endurance: Endurance does not limit participation in activity    Bed Mobility/Transfers:   Bed Mobility  Bed Mobility: Yes  Bed Mobility 1  Bed Mobility 1: Supine to sitting  Level of Assistance 1: Moderate assistance, +2  Transfers  Transfer:  (sit<>stand from bed and from toilet with mod A x2 FWW)    Ambulation/Gait Training:  Functional Mobility  Functional Mobility Performed:  (pt performed functional mobility bed to toilet to chair with min A x2 FWW)    Sitting Balance:  Static Sitting  Balance  Static Sitting-Level of Assistance: Close supervision  Dynamic Sitting Balance  Dynamic Sitting-Level of Assistance: Close supervision, Contact guard    Standing Balance:  Static Standing Balance  Static Standing-Level of Assistance: Minimum assistance  Dynamic Standing Balance  Dynamic Standing-Level of Assistance: Minimum assistance    Vision: Vision - Basic Assessment  Current Vision: No visual deficits   and      Sensation:  Light Touch: No apparent deficits    Strength:  Strength Comments: BUE grossly 3+/5 to 4-/5    Perception:  Inattention/Neglect: Appears intact    Coordination:  Movements are Fluid and Coordinated: Yes     Hand Function:  Hand Function  Gross Grasp: Functional    Extremities: RUE   RUE : Within Functional Limits and LUE   LUE: Within Functional Limits    Outcome Measures: [facility] Daily Activity   Putting on and taking off regular lower body clothing: Total  Bathing (including washing, rinsing, drying): A lot  Putting on and taking off regular upper body clothing: A lot  Toileting, which includes using toilet, bedpan or urinal: Total  Taking care of personal grooming such as brushing teeth: A little   Eating Meals: A little   Daily Activity - Total Score: 12    EDUCATION:     Education Documentation  ADL Training, taught by Paty Gutiérrez OT at 2/13/2025  4:09 PM.  Learner: Patient  Readiness: Acceptance  Method: Explanation  Response: Verbalizes Understanding    Education Comments  No comments found.        Goals:   Encounter Problems       Encounter Problems (Active)       ADLs       Patient with complete upper body dressing with supervision level of assistance donning and doffing all UE clothes with PRN adaptive equipment while supported sitting (Progressing)       Start:  02/13/25    Expected End:  02/27/25            Patient with complete lower body dressing with supervision level of assistance donning and doffing all LE clothes  with PRN adaptive equipment while supported  sitting and standing (Progressing)       Start:  02/13/25    Expected End:  02/27/25            Patient will complete toileting including hygiene clothing management/hygiene with supervision level of assistance and raised toilet seat and grab bars. (Progressing)       Start:  02/13/25    Expected End:  02/27/25               TRANSFERS       Patient will complete functional transfers and mobility with least restrictive device with modified independent level of assistance. (Progressing)       Start:  02/13/25    Expected End:  02/27/25

## 2025-02-14 PROCEDURE — 97110 THERAPEUTIC EXERCISES: CPT | Mod: GP,CQ

## 2025-02-14 PROCEDURE — 2500000001 HC RX 250 WO HCPCS SELF ADMINISTERED DRUGS (ALT 637 FOR MEDICARE OP): Performed by: SURGERY

## 2025-02-14 PROCEDURE — 2500000001 HC RX 250 WO HCPCS SELF ADMINISTERED DRUGS (ALT 637 FOR MEDICARE OP)

## 2025-02-14 PROCEDURE — 2500000005 HC RX 250 GENERAL PHARMACY W/O HCPCS: Performed by: EMERGENCY MEDICINE

## 2025-02-14 PROCEDURE — 2500000004 HC RX 250 GENERAL PHARMACY W/ HCPCS (ALT 636 FOR OP/ED)

## 2025-02-14 PROCEDURE — 99233 SBSQ HOSP IP/OBS HIGH 50: CPT | Performed by: INTERNAL MEDICINE

## 2025-02-14 PROCEDURE — 99232 SBSQ HOSP IP/OBS MODERATE 35: CPT | Performed by: SURGERY

## 2025-02-14 PROCEDURE — 1210000001 HC SEMI-PRIVATE ROOM DAILY

## 2025-02-14 PROCEDURE — 2500000002 HC RX 250 W HCPCS SELF ADMINISTERED DRUGS (ALT 637 FOR MEDICARE OP, ALT 636 FOR OP/ED)

## 2025-02-14 PROCEDURE — 97116 GAIT TRAINING THERAPY: CPT | Mod: GP,CQ

## 2025-02-14 RX ADMIN — DICYCLOMINE HYDROCHLORIDE 20 MG: 10 CAPSULE ORAL at 06:00

## 2025-02-14 RX ADMIN — POLYETHYLENE GLYCOL 3350 17 G: 17 POWDER, FOR SOLUTION ORAL at 06:04

## 2025-02-14 RX ADMIN — OXYCODONE HYDROCHLORIDE 5 MG: 5 TABLET ORAL at 16:20

## 2025-02-14 RX ADMIN — DICYCLOMINE HYDROCHLORIDE 20 MG: 10 CAPSULE ORAL at 16:20

## 2025-02-14 RX ADMIN — PANTOPRAZOLE SODIUM 40 MG: 40 TABLET, DELAYED RELEASE ORAL at 06:00

## 2025-02-14 RX ADMIN — ACETAMINOPHEN 650 MG: 325 TABLET ORAL at 16:24

## 2025-02-14 RX ADMIN — SERTRALINE HYDROCHLORIDE 100 MG: 50 TABLET ORAL at 09:16

## 2025-02-14 RX ADMIN — METHOCARBAMOL TABLETS 500 MG: 500 TABLET, COATED ORAL at 14:06

## 2025-02-14 RX ADMIN — OXYCODONE HYDROCHLORIDE 5 MG: 5 TABLET ORAL at 09:15

## 2025-02-14 RX ADMIN — ACETAMINOPHEN 650 MG: 325 TABLET ORAL at 05:58

## 2025-02-14 RX ADMIN — ACETAMINOPHEN 650 MG: 325 TABLET ORAL at 22:44

## 2025-02-14 RX ADMIN — METHOCARBAMOL TABLETS 500 MG: 500 TABLET, COATED ORAL at 05:58

## 2025-02-14 RX ADMIN — TRIAMTERENE AND HYDROCHLOROTHIAZIDE 1 TABLET: 37.5; 25 TABLET ORAL at 09:15

## 2025-02-14 RX ADMIN — METHOCARBAMOL TABLETS 500 MG: 500 TABLET, COATED ORAL at 22:44

## 2025-02-14 RX ADMIN — LOSARTAN POTASSIUM 100 MG: 50 TABLET, FILM COATED ORAL at 09:16

## 2025-02-14 RX ADMIN — ACETAMINOPHEN 650 MG: 325 TABLET ORAL at 10:18

## 2025-02-14 RX ADMIN — ENOXAPARIN SODIUM 30 MG: 30 INJECTION SUBCUTANEOUS at 14:06

## 2025-02-14 RX ADMIN — DICYCLOMINE HYDROCHLORIDE 20 MG: 10 CAPSULE ORAL at 22:44

## 2025-02-14 RX ADMIN — LIDOCAINE 4% 1 PATCH: 40 PATCH TOPICAL at 09:16

## 2025-02-14 RX ADMIN — DICYCLOMINE HYDROCHLORIDE 20 MG: 10 CAPSULE ORAL at 10:20

## 2025-02-14 ASSESSMENT — COGNITIVE AND FUNCTIONAL STATUS - GENERAL
MOVING TO AND FROM BED TO CHAIR: A LOT
TURNING FROM BACK TO SIDE WHILE IN FLAT BAD: A LOT
MOBILITY SCORE: 20
STANDING UP FROM CHAIR USING ARMS: A LOT
MOVING TO AND FROM BED TO CHAIR: A LITTLE
HELP NEEDED FOR BATHING: A LITTLE
MOBILITY SCORE: 13
CLIMB 3 TO 5 STEPS WITH RAILING: A LOT
WALKING IN HOSPITAL ROOM: A LITTLE
DRESSING REGULAR LOWER BODY CLOTHING: A LITTLE
DAILY ACTIVITIY SCORE: 20
STANDING UP FROM CHAIR USING ARMS: A LITTLE
TOILETING: A LOT
CLIMB 3 TO 5 STEPS WITH RAILING: A LITTLE
MOBILITY SCORE: 24
DAILY ACTIVITIY SCORE: 24
WALKING IN HOSPITAL ROOM: A LOT
MOVING FROM LYING ON BACK TO SITTING ON SIDE OF FLAT BED WITH BEDRAILS: A LITTLE

## 2025-02-14 ASSESSMENT — PAIN - FUNCTIONAL ASSESSMENT
PAIN_FUNCTIONAL_ASSESSMENT: 0-10

## 2025-02-14 ASSESSMENT — PAIN DESCRIPTION - ORIENTATION
ORIENTATION: RIGHT

## 2025-02-14 ASSESSMENT — PAIN DESCRIPTION - DESCRIPTORS
DESCRIPTORS: ACHING

## 2025-02-14 ASSESSMENT — PAIN DESCRIPTION - LOCATION
LOCATION: BACK

## 2025-02-14 ASSESSMENT — ENCOUNTER SYMPTOMS
CONFUSION: 0
ABDOMINAL DISTENTION: 0
NAUSEA: 0
FEVER: 0
VOMITING: 0
COUGH: 0
SHORTNESS OF BREATH: 0

## 2025-02-14 ASSESSMENT — PAIN SCALES - GENERAL
PAINLEVEL_OUTOF10: 0 - NO PAIN
PAINLEVEL_OUTOF10: 3
PAINLEVEL_OUTOF10: 5 - MODERATE PAIN
PAINLEVEL_OUTOF10: 3
PAINLEVEL_OUTOF10: 5 - MODERATE PAIN
PAINLEVEL_OUTOF10: 6
PAINLEVEL_OUTOF10: 5 - MODERATE PAIN

## 2025-02-14 ASSESSMENT — PAIN SCALES - PAIN ASSESSMENT IN ADVANCED DEMENTIA (PAINAD): TOTALSCORE: MEDICATION (SEE MAR)

## 2025-02-14 NOTE — PROGRESS NOTES
"    GENERAL SURGERY / TRAUMA PROGRESS NOTE    Patient: Luther Silverio  Room: 3311/3311-A    Age: 89 y.o.   Gender: male  Attending: Ced Norwood MD PhD    MRN: [id number]  Admission Date: 2/12/2025    PCP: Aleksandr Palm MD       Luther Silverio is a 89 y.o. male on day 2  of admission presenting with Fall, initial encounter.    SUBJECTIVE   Interval History:  He states as long as he stays still in bed, he does not have any pain.  However, when he moves he continues to have pain.  Tolerating diet.  Pulling 2000 on his incentive spirometer.  Apparently is not asking for his pain medication.  Patient does not seem to fully understand that he has to request pain medication and was started on scheduled oxycodone yesterday.    ROS  Review of Systems   Constitutional:  Negative for fever.   Respiratory:  Negative for cough and shortness of breath.    Cardiovascular:  Positive for chest pain.   Gastrointestinal:  Negative for abdominal distention, nausea and vomiting.   Psychiatric/Behavioral:  Negative for confusion.           OBJECTIVE   Last Recorded Vitals  Blood pressure 138/65, pulse 76, temperature 37.2 °C (98.9 °F), temperature source Temporal, resp. rate 16, height 1.803 m (5' 11\"), weight 89.2 kg (196 lb 10.4 oz), SpO2 93%.    Intake/Output last 3 Shifts:  I/O last 3 completed shifts:  In: 610 (6.8 mL/kg) [P.O.:610]  Out: - (0 mL/kg)   Weight: 89.2 kg     PHYSICAL EXAM  Physical Exam   Constitutional:       General: He is not in acute distress.     Appearance: He is not toxic-appearing.   HENT:      Head: Normocephalic and atraumatic.      Right Ear: External ear normal.      Left Ear: External ear normal.      Nose: Nose normal.      Mouth/Throat:      Mouth: Mucous membranes are moist.   Eyes:      General: No scleral icterus.     Pupils: Pupils are equal, round, and reactive to light.   Cardiovascular:      Rate and Rhythm: Normal rate and regular rhythm.      Pulses: Normal pulses.   Pulmonary:      " Effort: Pulmonary effort is normal.      Breath sounds: Normal breath sounds.      Comments: Tenderness posterior right sided ribs.  No crepitus.  Some light bruising in the mid axillary area.  Chest:      Chest wall: Tenderness present.   Abdominal:      General: There is no distension.      Palpations: Abdomen is soft.      Tenderness: There is no abdominal tenderness.   Musculoskeletal:         General: No swelling.      Cervical back: Neck supple. No tenderness.   Skin:     General: Skin is warm and dry.   Neurological:      General: No focal deficit present.      Mental Status: He is alert.     RESULTS   Labs  No results found for this or any previous visit (from the past 24 hours).      Radiology Resutls  ECG 12 lead    Result Date: 2/13/2025  Sinus rhythm Prolonged WA interval Right bundle branch block See ED provider note for full interpretation and clinical correlation Confirmed by Porsha Banda ([id number]) on 2/13/2025 7:00:07 AM    CT chest abdomen pelvis wo IV contrast    Result Date: 2/12/2025  STUDY: CT Chest, Abdomen, and Pelvis without IV Contrast, CT Thoracic Spine and Lumbar Spine without IV; 2/12/2025 12:22 PM. INDICATION: Right rib pain with midline thoracic and lumbar spine pain; Status post fall on Monday. COMPARISON: Pelvis radiograph 7/9/2024.  US renal 12/27/2021. ACCESSION NUMBER(S): [id number], [id number], [id number] ORDERING CLINICIAN: HERBIE ZULETA TECHNIQUE: CT of the chest, abdomen, and pelvis was performed.  Contiguous axial images were obtained at 3 mm slice thickness through the chest, abdomen, and pelvis.  Coronal and sagittal reconstructions at 3 mm slice thickness were performed.  No intravenous contrast was administered.  Please note that spinal images were generated from the original CT abdomen and pelvis imaging. FINDINGS: Please note that the evaluation of vessels, lymph nodes and organs is limited without intravenous contrast. CHEST: There is enlargement of the right  lobe of the thyroid.  This is best evaluated with ultrasound. MEDIASTINUM: The heart is normal in size without pericardial effusion.  Coronary artery calcifications are identified.  LUNGS/PLEURA: There is a right pleural effusion. There are bibasilar pulmonary infiltrates.  These may represent atelectasis. LYMPH NODES: Thoracic lymph nodes are not enlarged. ABDOMEN:  LIVER: No hepatomegaly.  Smooth surface contour.  Normal attenuation.  There is a 2.8 cm lucency posteriorly in the right lobe of the liver.  This most likely represents hepatic cyst.  BILE DUCTS: No intrahepatic or extrahepatic biliary ductal dilatation.  GALLBLADDER: There are gallstones in the neck of the gallbladder. STOMACH: No abnormalities identified.  PANCREAS: No masses or ductal dilatation.  SPLEEN: No splenomegaly or focal splenic lesion.  ADRENAL GLANDS: No thickening or nodules.  KIDNEYS AND URETERS: Kidneys are normal in size and location.  No renal or ureteral calculi.  There is a 2.2 cm cyst on the left kidney.  PELVIS:  BLADDER: No abnormalities identified.  REPRODUCTIVE ORGANS: No abnormalities identified.  BOWEL: There is a duodenal diverticulum. VESSELS: No abnormalities identified.  Abdominal aorta is normal in caliber.  PERITONEUM/RETROPERITONEUM/LYMPH NODES: No free fluid.  No pneumoperitoneum. No lymphadenopathy.  ABDOMINAL WALL: No abnormalities identified. SOFT TISSUES: No abnormalities identified.  BONES: There are acute fractures of the right wrist series 6, seventh, eighth and ninth ribs.  The eighth and ninth ribs are fractured in multiple locations. THORACIC SPINE: The alignment is anatomic.  There is no fracture or traumatic subluxation. The vertebral body heights are well maintained.  There is diffuse intervertebral disc space narrowing.  This is most pronounced at the thoracolumbar junction with anterior osteophytes. The paravertebral soft tissues are within normal limits.  LUMBAR SPINE: The alignment is anatomic.   There is no fracture or traumatic subluxation. The vertebral body heights are well maintained.  There is diffuse intervertebral disc space narrowing with vacuum discs.  There is bony fusion of L4 on L5. The paravertebral soft tissues are within normal limits.      Acute fractures of the right posterior sixth, seventh, eighth and ninth ribs with the eighth and ninth ribs fractured in multiple locations. Bibasilar pulmonary infiltrates with right pleural effusion. Cholelithiasis. Enlargement of the right lobe of the thyroid.  This is best evaluated with ultrasound. Diffuse degenerative changes of the thoracic and lumbosacral spines. Signed by Rio Gross MD    CT thoracic spine wo IV contrast    Result Date: 2/12/2025  STUDY: CT Chest, Abdomen, and Pelvis without IV Contrast, CT Thoracic Spine and Lumbar Spine without IV; 2/12/2025 12:22 PM. INDICATION: Right rib pain with midline thoracic and lumbar spine pain; Status post fall on Monday. COMPARISON: Pelvis radiograph 7/9/2024.  US renal 12/27/2021. ACCESSION NUMBER(S): [id number], [id number], [id number] ORDERING CLINICIAN: HERBIE ZULETA TECHNIQUE: CT of the chest, abdomen, and pelvis was performed.  Contiguous axial images were obtained at 3 mm slice thickness through the chest, abdomen, and pelvis.  Coronal and sagittal reconstructions at 3 mm slice thickness were performed.  No intravenous contrast was administered.  Please note that spinal images were generated from the original CT abdomen and pelvis imaging. FINDINGS: Please note that the evaluation of vessels, lymph nodes and organs is limited without intravenous contrast. CHEST: There is enlargement of the right lobe of the thyroid.  This is best evaluated with ultrasound. MEDIASTINUM: The heart is normal in size without pericardial effusion.  Coronary artery calcifications are identified.  LUNGS/PLEURA: There is a right pleural effusion. There are bibasilar pulmonary infiltrates.  These may represent  atelectasis. LYMPH NODES: Thoracic lymph nodes are not enlarged. ABDOMEN:  LIVER: No hepatomegaly.  Smooth surface contour.  Normal attenuation.  There is a 2.8 cm lucency posteriorly in the right lobe of the liver.  This most likely represents hepatic cyst.  BILE DUCTS: No intrahepatic or extrahepatic biliary ductal dilatation.  GALLBLADDER: There are gallstones in the neck of the gallbladder. STOMACH: No abnormalities identified.  PANCREAS: No masses or ductal dilatation.  SPLEEN: No splenomegaly or focal splenic lesion.  ADRENAL GLANDS: No thickening or nodules.  KIDNEYS AND URETERS: Kidneys are normal in size and location.  No renal or ureteral calculi.  There is a 2.2 cm cyst on the left kidney.  PELVIS:  BLADDER: No abnormalities identified.  REPRODUCTIVE ORGANS: No abnormalities identified.  BOWEL: There is a duodenal diverticulum. VESSELS: No abnormalities identified.  Abdominal aorta is normal in caliber.  PERITONEUM/RETROPERITONEUM/LYMPH NODES: No free fluid.  No pneumoperitoneum. No lymphadenopathy.  ABDOMINAL WALL: No abnormalities identified. SOFT TISSUES: No abnormalities identified.  BONES: There are acute fractures of the right wrist series 6, seventh, eighth and ninth ribs.  The eighth and ninth ribs are fractured in multiple locations. THORACIC SPINE: The alignment is anatomic.  There is no fracture or traumatic subluxation. The vertebral body heights are well maintained.  There is diffuse intervertebral disc space narrowing.  This is most pronounced at the thoracolumbar junction with anterior osteophytes. The paravertebral soft tissues are within normal limits.  LUMBAR SPINE: The alignment is anatomic.  There is no fracture or traumatic subluxation. The vertebral body heights are well maintained.  There is diffuse intervertebral disc space narrowing with vacuum discs.  There is bony fusion of L4 on L5. The paravertebral soft tissues are within normal limits.      Acute fractures of the right  posterior sixth, seventh, eighth and ninth ribs with the eighth and ninth ribs fractured in multiple locations. Bibasilar pulmonary infiltrates with right pleural effusion. Cholelithiasis. Enlargement of the right lobe of the thyroid.  This is best evaluated with ultrasound. Diffuse degenerative changes of the thoracic and lumbosacral spines. Signed by Rio Gross MD    CT lumbar spine wo IV contrast    Result Date: 2/12/2025  STUDY: CT Chest, Abdomen, and Pelvis without IV Contrast, CT Thoracic Spine and Lumbar Spine without IV; 2/12/2025 12:22 PM. INDICATION: Right rib pain with midline thoracic and lumbar spine pain; Status post fall on Monday. COMPARISON: Pelvis radiograph 7/9/2024.  US renal 12/27/2021. ACCESSION NUMBER(S): [id number], [id number], [id number] ORDERING CLINICIAN: HERBIE ZULETA TECHNIQUE: CT of the chest, abdomen, and pelvis was performed.  Contiguous axial images were obtained at 3 mm slice thickness through the chest, abdomen, and pelvis.  Coronal and sagittal reconstructions at 3 mm slice thickness were performed.  No intravenous contrast was administered.  Please note that spinal images were generated from the original CT abdomen and pelvis imaging. FINDINGS: Please note that the evaluation of vessels, lymph nodes and organs is limited without intravenous contrast. CHEST: There is enlargement of the right lobe of the thyroid.  This is best evaluated with ultrasound. MEDIASTINUM: The heart is normal in size without pericardial effusion.  Coronary artery calcifications are identified.  LUNGS/PLEURA: There is a right pleural effusion. There are bibasilar pulmonary infiltrates.  These may represent atelectasis. LYMPH NODES: Thoracic lymph nodes are not enlarged. ABDOMEN:  LIVER: No hepatomegaly.  Smooth surface contour.  Normal attenuation.  There is a 2.8 cm lucency posteriorly in the right lobe of the liver.  This most likely represents hepatic cyst.  BILE DUCTS: No intrahepatic or  extrahepatic biliary ductal dilatation.  GALLBLADDER: There are gallstones in the neck of the gallbladder. STOMACH: No abnormalities identified.  PANCREAS: No masses or ductal dilatation.  SPLEEN: No splenomegaly or focal splenic lesion.  ADRENAL GLANDS: No thickening or nodules.  KIDNEYS AND URETERS: Kidneys are normal in size and location.  No renal or ureteral calculi.  There is a 2.2 cm cyst on the left kidney.  PELVIS:  BLADDER: No abnormalities identified.  REPRODUCTIVE ORGANS: No abnormalities identified.  BOWEL: There is a duodenal diverticulum. VESSELS: No abnormalities identified.  Abdominal aorta is normal in caliber.  PERITONEUM/RETROPERITONEUM/LYMPH NODES: No free fluid.  No pneumoperitoneum. No lymphadenopathy.  ABDOMINAL WALL: No abnormalities identified. SOFT TISSUES: No abnormalities identified.  BONES: There are acute fractures of the right wrist series 6, seventh, eighth and ninth ribs.  The eighth and ninth ribs are fractured in multiple locations. THORACIC SPINE: The alignment is anatomic.  There is no fracture or traumatic subluxation. The vertebral body heights are well maintained.  There is diffuse intervertebral disc space narrowing.  This is most pronounced at the thoracolumbar junction with anterior osteophytes. The paravertebral soft tissues are within normal limits.  LUMBAR SPINE: The alignment is anatomic.  There is no fracture or traumatic subluxation. The vertebral body heights are well maintained.  There is diffuse intervertebral disc space narrowing with vacuum discs.  There is bony fusion of L4 on L5. The paravertebral soft tissues are within normal limits.      Acute fractures of the right posterior sixth, seventh, eighth and ninth ribs with the eighth and ninth ribs fractured in multiple locations. Bibasilar pulmonary infiltrates with right pleural effusion. Cholelithiasis. Enlargement of the right lobe of the thyroid.  This is best evaluated with ultrasound. Diffuse degenerative  changes of the thoracic and lumbosacral spines. Signed by Rio Gross MD        ASSESSMENT / PLAN     Assessment & Plan  Fall, initial encounter    Multiple fractures of ribs, right side, initial encounter for closed fracture         PLAN  1.  Continues to remain somewhat confused about asking for pain medication, is getting scheduled oxycodone 5 mg 3 times daily.  Will continue this and continue to monitor if it improves his pain.  Otherwise continue on other multimodal pain management with lidocaine patches, scheduled Tylenol, Robaxin, and as needed narcotics.  2.  Encouraged him to increase his physical activity.  Reviewed that lack of mobility and lack of incentive spirometer use can lead to pneumonia which which is the biggest complication risk associated with rib fractures.  3.  Seen by PT/OT.  Recommended for placement upon discharge.  TC initiating SNF placement.      Discussed with attending Dr. Marielle Treviño, DO - PGY4  General Surgery

## 2025-02-14 NOTE — PROGRESS NOTES
Occupational Therapy                 Therapy Communication Note    Patient Name: Luther Silverio  MRN: [id number]  Department: [facility] [address]  Room: [address] [place] [address]  Today's Date: 2/14/2025     Discipline: Occupational Therapy          Missed Visit Reason:  Pt sound asleep, unable to arouse    Missed Time: Attempt    Comment:

## 2025-02-14 NOTE — PROGRESS NOTES
Physical Therapy    Physical Therapy Treatment    Patient Name: Luther Silverio  MRN: [id number]  Department: [address]  Room: [address] [place] [address]  Today's Date: 2/14/2025  Time Calculation  Start Time: 0815  Stop Time: 0838  Time Calculation (min): 23 min         Assessment/Plan   PT Assessment  End of Session Communication: Bedside nurse  Assessment Comment: patient  incresed gait to 60 feet, requires verbal and tactile cues for safety follow through. fatigues quickly  End of Session Patient Position: Up in chair, Alarm on  PT Plan  Inpatient/Swing Bed or Outpatient: Inpatient  PT Plan  Treatment/Interventions: Bed mobility, Transfer training, Gait training, Balance training, Strengthening, Therapeutic exercise, Therapeutic activity, Home exercise program  PT Plan: Ongoing PT  PT Frequency: 5 times per week  PT Discharge Recommendations: High intensity level of continued care  Equipment Recommended upon Discharge:  (TBD - has SPC at home.)  PT Recommended Transfer Status: Assist x2  PT - OK to Discharge: Yes (To next level of care when medically cleared.)      General Visit Information:   PT  Visit  PT Received On: 02/14/25  Response to Previous Treatment: Patient reporting fatigue but able to participate.  General  Prior to Session Communication: Bedside nurse  Patient Position Received: Up in chair, Alarm off, not on at start of session (alarm added)  General Comment:     Subjective   Precautions:        Date/Time Vitals Session Patient Position Pulse Resp SpO2 BP MAP (mmHg)    02/14/25 0905 --  --  76  16  93 %  138/65  89                 Objective   Pain:  Pain Assessment  Pain Assessment: 0-10  0-10 (Numeric) Pain Score: 5 - Moderate pain  Pain Type: Acute pain  Pain Location: Back  Pain Interventions: Ambulation/increased activity  Response to Interventions:  (no change but glad to walk)  Cognition:  Cognition  Orientation Level: Disoriented to place, Disoriented to time    Activity Tolerance:  Activity  Tolerance  Endurance: Tolerates 10 - 20 min exercise with multiple rests  Treatments:  patient performed Aggie LE ankle pumps , marches, LAQ, hip adduction 20 reps each. sit to stand with min assist  gait training with FWW 60 feet with min assist and verbal cues to maintain feet inside FWW and  increase step length. stand to sit with min assist and  verbal cues    Outcome Measures:  [facility] Basic Mobility  Turning from your back to your side while in a flat bed without using bedrails: A little  Moving from lying on your back to sitting on the side of a flat bed without using bedrails: A lot  Moving to and from bed to chair (including a wheelchair): A lot  Standing up from a chair using your arms (e.g. wheelchair or bedside chair): A lot  To walk in hospital room: A lot  Climbing 3-5 steps with railing: A lot  Basic Mobility - Total Score: 13    Education Documentation  Body Mechanics, taught by Marimar Solitario PTA at 2/14/2025  9:24 AM.  Learner: Patient  Readiness: Acceptance  Method: Explanation  Response: Verbalizes Understanding    Mobility Training, taught by Marimar Solitario PTA at 2/14/2025  9:24 AM.  Learner: Patient  Readiness: Acceptance  Method: Explanation  Response: Verbalizes Understanding    Education Comments  No comments found.        OP EDUCATION:       Encounter Problems       Encounter Problems (Active)       Pain - Adult          To improve strength, balance, and decreased pain with mobility:        Patient will demonstrate awareness of techniques to reduce R rib pain with functional mobility with <25% VC. (Progressing)       Start:  02/13/25    Expected End:  02/27/25            Patient will complete supine <> sit and transfers with MIN A of 2. (Progressing)       Start:  02/13/25    Expected End:  02/27/25            Patient will ambulate 50 feet x 2 with WW and CGA of 1 with increased step length.  (Progressing)       Start:  02/13/25    Expected End:  02/27/25

## 2025-02-14 NOTE — PROGRESS NOTES
Received phone call from patients granddaughter Halley to discuss SNF placement. Notified her that he is being recommended for SNF placement, patient is now confused and Halley is POA. She is agreeable to SNF placement and requested referral be sent to [facility] as he has been there prior. Patients wife's daughter is coming to stay with his wife while patient would be in SNF. Will request [facility] send referrals.      1121 [facility] can accept patient. Requested [facility] start auth.     1445 Met with patient, patients wife and patients daughter at bedside. Explained that patient is being recommended for SNF placement. Patient and family are agreeable to [facility]. Answered all questions and verbalized understanding.

## 2025-02-14 NOTE — PROGRESS NOTES
Luther Silverio is a 89 y.o. male on day 2 of admission presenting with Fall, initial encounter.    Subjective   Luther Silverio is a 89 y.o. male with PMHx s/f HTN, HLD, CKD stage IIIb, recurrent MDD, prediabetes, ED presenting with right back/rib pain.  He states he was grabbing something from the kitchen on Monday, tripped on something and fell backward on a doorknob and has bruising to right back rib area and is tender with any movement especially aggravated with coughing.  Denies hitting his head, LOC, being on blood thinners.  Denies lightheadedness, dizziness, neck pain, shortness of breath.     ED Course (Summary - please note all labs, imaging studies, and interventions noted below have been personally reviewed and/or interpreted on day of admission):   Vitals on presentation: 98.3 F, 60 bpm, 61 RR, 131/77, 95% on RA  Labs: CMP-glucose 121, BUN/creatinine 36/1.73, EGFR 35  INR/PT 1.2/13.2  CBC-WBC 4.3, hemoglobin 10.1, platelets 92, lymphocyte subset 0.73  EKG: sinus rhythm at 57 bpm, prolonged MI, right bundle branch block.  .  QTc 451.  Imaging: CT thoracic, lumbar, chest AP-Acute fractures of the right posterior sixth, seventh, eighth and ninth ribs with the eighth and ninth ribs fractured in multiple locations. Bibasilar pulmonary infiltrates with right pleural effusion. Cholelithiasis. Enlargement of the right lobe of the thyroid.  This is best evaluated with ultrasound. Diffuse degenerative changes of the thoracic and lumbosacral spines.  Interventions: Robaxin 500 mg, morphine 2 mg, Zofran 4 mg, admission further management     12-point ROS reviewed and found to be negative aside from aforementioned positives in HPI and/or noted in dedicated ROS section below.     2/13: Pain well controlled if he is not moving.     2/14: Unchanged.        Objective     Constitutional: Pleasant and cooperative. Laying in bed in no acute distress. Conversant.   Skin: Warm and dry; no obvious lesions, rashes,  "pallor, or jaundice.   Eyes: EOMI. Anicteric sclera.   ENT: Mucous membranes moist; no obvious injury or deformity appreciated.   Head and Neck: Normocephalic, atraumatic. ROM preserved. Trachea midline. No appreciable JVD.   Respiratory: Nonlabored on RA. Lungs clear to auscultation bilaterally without obvious adventitious sounds. Chest rise is equal.  Cardiovascular: RRR. No gross murmur, gallop, or rub. Extremities are warm and well-perfused with good capillary refill (< 3 seconds). No chest wall tenderness.   GI: Abdomen soft, nontender, nondistended. No obvious organomegaly appreciated. Bowel sounds are present.  : No CVA tenderness.   MSK: Bruising noted under right armpit with tenderness in right-sided chest wall and lateral mid thoracic/upper lumbar back. No limitations to AROM/PROM appreciated.   Extremities: No cyanosis, edema, or clubbing evident. Neurovascularly intact.   Neuro: A&Ox3. CN 2-12 grossly intact. Able to respond to questions appropriately and clearly. No acute focal neurologic deficits appreciated.  Psych: Appropriate mood and behavior.    Last Recorded Vitals  Blood pressure 138/65, pulse 76, temperature 37.2 °C (98.9 °F), temperature source Temporal, resp. rate 16, height 1.803 m (5' 11\"), weight 89.2 kg (196 lb 10.4 oz), SpO2 93%.  Intake/Output last 3 Shifts:  I/O last 3 completed shifts:  In: 610 (6.8 mL/kg) [P.O.:610]  Out: - (0 mL/kg)   Weight: 89.2 kg     Relevant Results                              Assessment/Plan   Acute fracture of right posterior sixth and ninth ribs after fall  -pain control  -aggressive incentive spirometry  -trauma surgery consult     HTN  -BP stable, controlled  -Continue home losartan, triamterene-hydrochlorothiazide     CKD stage IIIb  -RFP at baseline  -continue to trend while admitted     Chronic constipation  -continue home bentyl     Recurrent MDD  -Continue home Zoloft    Dispo  -SNF, awaiting auth      Ced Norwood MD PhD      "

## 2025-02-15 VITALS
RESPIRATION RATE: 16 BRPM | HEIGHT: 71 IN | BODY MASS INDEX: 27.53 KG/M2 | WEIGHT: 196.65 LBS | DIASTOLIC BLOOD PRESSURE: 73 MMHG | OXYGEN SATURATION: 89 % | TEMPERATURE: 98.3 F | SYSTOLIC BLOOD PRESSURE: 169 MMHG | HEART RATE: 72 BPM
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
## 2025-02-15 PROCEDURE — 2500000005 HC RX 250 GENERAL PHARMACY W/O HCPCS: Performed by: EMERGENCY MEDICINE

## 2025-02-15 PROCEDURE — 2500000001 HC RX 250 WO HCPCS SELF ADMINISTERED DRUGS (ALT 637 FOR MEDICARE OP)

## 2025-02-15 PROCEDURE — 2500000001 HC RX 250 WO HCPCS SELF ADMINISTERED DRUGS (ALT 637 FOR MEDICARE OP): Performed by: SURGERY

## 2025-02-15 PROCEDURE — 2500000004 HC RX 250 GENERAL PHARMACY W/ HCPCS (ALT 636 FOR OP/ED)

## 2025-02-15 PROCEDURE — 99232 SBSQ HOSP IP/OBS MODERATE 35: CPT | Performed by: SURGERY

## 2025-02-15 PROCEDURE — 99239 HOSP IP/OBS DSCHRG MGMT >30: CPT | Performed by: INTERNAL MEDICINE

## 2025-02-15 PROCEDURE — 2500000002 HC RX 250 W HCPCS SELF ADMINISTERED DRUGS (ALT 637 FOR MEDICARE OP, ALT 636 FOR OP/ED)

## 2025-02-15 RX ORDER — OXYCODONE HYDROCHLORIDE 5 MG/1
5 TABLET ORAL EVERY 6 HOURS PRN
Qty: 12 TABLET | Refills: 0 | Status: SHIPPED | OUTPATIENT
Start: 2025-02-15

## 2025-02-15 RX ORDER — LIDOCAINE 50 MG/G
1 PATCH TOPICAL DAILY
Start: 2025-02-15

## 2025-02-15 RX ADMIN — ACETAMINOPHEN 650 MG: 325 TABLET ORAL at 05:28

## 2025-02-15 RX ADMIN — TRIAMTERENE AND HYDROCHLOROTHIAZIDE 1 TABLET: 37.5; 25 TABLET ORAL at 08:22

## 2025-02-15 RX ADMIN — PANTOPRAZOLE SODIUM 40 MG: 40 TABLET, DELAYED RELEASE ORAL at 05:28

## 2025-02-15 RX ADMIN — ACETAMINOPHEN 650 MG: 325 TABLET ORAL at 14:27

## 2025-02-15 RX ADMIN — SERTRALINE HYDROCHLORIDE 100 MG: 50 TABLET ORAL at 08:23

## 2025-02-15 RX ADMIN — LIDOCAINE 4% 1 PATCH: 40 PATCH TOPICAL at 08:25

## 2025-02-15 RX ADMIN — METHOCARBAMOL TABLETS 500 MG: 500 TABLET, COATED ORAL at 14:27

## 2025-02-15 RX ADMIN — METHOCARBAMOL TABLETS 500 MG: 500 TABLET, COATED ORAL at 05:28

## 2025-02-15 RX ADMIN — ENOXAPARIN SODIUM 30 MG: 30 INJECTION SUBCUTANEOUS at 14:27

## 2025-02-15 RX ADMIN — LOSARTAN POTASSIUM 100 MG: 50 TABLET, FILM COATED ORAL at 08:23

## 2025-02-15 RX ADMIN — DICYCLOMINE HYDROCHLORIDE 20 MG: 10 CAPSULE ORAL at 05:28

## 2025-02-15 ASSESSMENT — ENCOUNTER SYMPTOMS
COUGH: 0
VOMITING: 0
FEVER: 0
CONFUSION: 0
NAUSEA: 0
SHORTNESS OF BREATH: 0
ABDOMINAL DISTENTION: 0

## 2025-02-15 ASSESSMENT — PAIN SCALES - GENERAL: PAINLEVEL_OUTOF10: 0 - NO PAIN

## 2025-02-15 ASSESSMENT — COGNITIVE AND FUNCTIONAL STATUS - GENERAL
MOBILITY SCORE: 24
DAILY ACTIVITIY SCORE: 24

## 2025-02-15 NOTE — PROGRESS NOTES
"    GENERAL SURGERY / TRAUMA PROGRESS NOTE    Patient: Luther Silverio  Room: 3311/3311-A    Age: 89 y.o.   Gender: male  Attending: Ced Norwood MD PhD    MRN: [id number]  Admission Date: 2/12/2025    PCP: Aleksandr Palm MD       Luther Silverio is a 89 y.o. male on day 3  of admission presenting with Fall, initial encounter.    SUBJECTIVE   Interval History:  Continues to complain of pain with movement, but still not asking nursing for pain medication.  Tolerating diet.  Given incentive spirometer, and he can take a deep breath up to 2000.  No new issues overnight.    ROS  Review of Systems   Constitutional:  Negative for fever.   Respiratory:  Negative for cough and shortness of breath.    Cardiovascular:  Positive for chest pain.   Gastrointestinal:  Negative for abdominal distention, nausea and vomiting.   Psychiatric/Behavioral:  Negative for confusion.           OBJECTIVE   Last Recorded Vitals  Blood pressure 156/66, pulse 63, temperature 36.8 °C (98.2 °F), temperature source Temporal, resp. rate 18, height 1.803 m (5' 11\"), weight 89.2 kg (196 lb 10.4 oz), SpO2 91%.    Intake/Output last 3 Shifts:  I/O last 3 completed shifts:  In: 672 (7.5 mL/kg) [P.O.:672]  Out: 0 (0 mL/kg)   Weight: 89.2 kg     PHYSICAL EXAM  Physical Exam   Constitutional:       General: He is not in acute distress.     Appearance: He is not toxic-appearing.   HENT:      Head: Normocephalic and atraumatic.      Right Ear: External ear normal.      Left Ear: External ear normal.      Nose: Nose normal.      Mouth/Throat:      Mouth: Mucous membranes are moist.   Eyes:      General: No scleral icterus.     Pupils: Pupils are equal, round, and reactive to light.   Cardiovascular:      Rate and Rhythm: Normal rate and regular rhythm.      Pulses: Normal pulses.   Pulmonary:      Effort: Pulmonary effort is normal.      Breath sounds: Normal breath sounds.      Comments: Tenderness posterior right sided ribs.  No crepitus.  Some " light bruising in the mid axillary area.  Chest:      Chest wall: Tenderness present.   Abdominal:      General: There is no distension.      Palpations: Abdomen is soft.      Tenderness: There is no abdominal tenderness.   Musculoskeletal:         General: No swelling.      Cervical back: Neck supple. No tenderness.   Skin:     General: Skin is warm and dry.   Neurological:      General: No focal deficit present.      Mental Status: He is alert.     RESULTS   Labs  No results found for this or any previous visit (from the past 24 hours).      Radiology Resutls  No results found.       ASSESSMENT / PLAN     Assessment & Plan  Multiple fractures of ribs, right side, initial encounter for closed fracture         PLAN  1.  Continues to remain somewhat confused about asking for pain medication, is getting scheduled oxycodone 5 mg 3 times daily.  Otherwise continue on other multimodal pain management with lidocaine patches, scheduled Tylenol, Robaxin, and as needed narcotics.  2.  Encouraged him to increase his physical activity.  Reviewed that lack of mobility and lack of incentive spirometer use can lead to pneumonia which which is the biggest complication risk associated with rib fractures.  3.  Seen by PT/OT.  Noted plans for discharge to skilled nursing facility.  Discharge instructions placed in discharge unit navigator.    Discussed with attending Dr. Marielle Treviño, DO - PGY4  General Surgery

## 2025-02-15 NOTE — DISCHARGE SUMMARY
DISCHARGE SUMMARY     Discharge Diagnosis  Fall, initial encounter    This discharge took greater than 35 minutes.    Test Results Pending At Discharge  Pending Labs       No current pending labs.            Hospital Course   Luther Silverio is a 89 y.o. male with PMHx s/f HTN, HLD, CKD stage IIIb, recurrent MDD, prediabetes, ED presenting with right back/rib pain.  He states he was grabbing something from the kitchen on Monday, tripped on something and fell backward on a doorknob and has bruising to right back rib area and is tender with any movement especially aggravated with coughing.  Denies hitting his head, LOC, being on blood thinners.  Denies lightheadedness, dizziness, neck pain, shortness of breath.     ED Course (Summary - please note all labs, imaging studies, and interventions noted below have been personally reviewed and/or interpreted on day of admission):   Vitals on presentation: 98.3 F, 60 bpm, 61 RR, 131/77, 95% on RA  Labs: CMP-glucose 121, BUN/creatinine 36/1.73, EGFR 35  INR/PT 1.2/13.2  CBC-WBC 4.3, hemoglobin 10.1, platelets 92, lymphocyte subset 0.73  EKG: sinus rhythm at 57 bpm, prolonged OH, right bundle branch block.  .  QTc 451.  Imaging: CT thoracic, lumbar, chest AP-Acute fractures of the right posterior sixth, seventh, eighth and ninth ribs with the eighth and ninth ribs fractured in multiple locations. Bibasilar pulmonary infiltrates with right pleural effusion. Cholelithiasis. Enlargement of the right lobe of the thyroid.  This is best evaluated with ultrasound. Diffuse degenerative changes of the thoracic and lumbosacral spines.  Interventions: Robaxin 500 mg, morphine 2 mg, Zofran 4 mg, admission further management     12-point ROS reviewed and found to be negative aside from aforementioned positives in HPI and/or noted in dedicated ROS section below.      2/13: Pain well controlled if he is not moving.      2/14: Unchanged.      Acute fracture of right posterior sixth and  ninth ribs after fall  -pain control  -trauma surgery consult     HTN  -BP stable, controlled  -Continue home losartan, triamterene-hydrochlorothiazide     CKD stage IIIb  -RFP at baseline  -continue to trend while admitted     Chronic constipation  -continue home bentyl     Recurrent MDD  -Continue home Zoloft     Dispo  -SNF    Pertinent Physical Exam At Time of Discharge  Constitutional: Pleasant and cooperative. Laying in bed in no acute distress. Conversant.   Skin: Warm and dry; no obvious lesions, rashes, pallor, or jaundice.   Eyes: EOMI. Anicteric sclera.   ENT: Mucous membranes moist; no obvious injury or deformity appreciated.   Head and Neck: Normocephalic, atraumatic. ROM preserved. Trachea midline. No appreciable JVD.   Respiratory: Nonlabored on RA. Lungs clear to auscultation bilaterally without obvious adventitious sounds. Chest rise is equal.  Cardiovascular: RRR. No gross murmur, gallop, or rub. Extremities are warm and well-perfused with good capillary refill (< 3 seconds). No chest wall tenderness.   GI: Abdomen soft, nontender, nondistended. No obvious organomegaly appreciated. Bowel sounds are present.  : No CVA tenderness.   MSK: Bruising noted under right armpit with tenderness in right-sided chest wall and lateral mid thoracic/upper lumbar back. No limitations to AROM/PROM appreciated.   Extremities: No cyanosis, edema, or clubbing evident. Neurovascularly intact.   Neuro: A&Ox3. CN 2-12 grossly intact. Able to respond to questions appropriately and clearly. No acute focal neurologic deficits appreciated.  Psych: Appropriate mood and behavior.    Home Medications     Medication List      START taking these medications     lidocaine 5 % patch; Commonly known as: Lidoderm; Place 1 patch over 12   hours on the skin once daily. Apply to painful area 12 hours per day,   remove for 12 hours.   oxyCODONE 5 mg immediate release tablet; Commonly known as: Roxicodone;   Take 1 tablet (5 mg) by  mouth every 6 hours if needed for moderate pain (4   - 6) or severe pain (7 - 10).     CONTINUE taking these medications     dicyclomine 20 mg tablet; Commonly known as: Bentyl   losartan 100 mg tablet; Commonly known as: Cozaar   sertraline 100 mg tablet; Commonly known as: Zoloft   triamterene-hydrochlorothiazid 37.5-25 mg tablet; Commonly known as:   Maxzide-25     STOP taking these medications     mupirocin 2 % ointment; Commonly known as: Bactroban       Outpatient Follow-Up  No follow-ups on file.     Ced Norwood MD PhD  2/15/2025  9:03 AM

## 2025-02-15 NOTE — CARE PLAN
The patient's goals for the shift include      The clinical goals for the shift include Patient will be free from falls this shift.    Over the shift, the patient remains free from falls this shift.

## 2025-02-15 NOTE — CARE PLAN
The patient's goals for the shift include      The clinical goals for the shift include Patient will be free from falls this shift.

## 2025-02-15 NOTE — DISCHARGE INSTRUCTIONS
1.  Use incentive spirometer 3-4 times per day.  Should take 10 deep breaths with the incentive spirometer each time.  2.  Apply lidocaine patch to area of chest pain.  Lidocaine patch should be placed 12 hours on, and 12 hours off.  3.  Avoid any activity that causes trauma to the chest area.  4.  Ambulate with assistance.

## 2025-02-15 NOTE — PROGRESS NOTES
2/15/25 0824   02/15/25 0823   Discharge Planning   Home or Post Acute Services Post acute facilities (Rehab/SNF/etc)   Type of Post Acute Facility Services Skilled nursing   Expected Discharge Disposition SNF   Does the patient need discharge transport arranged? Yes   RoundTrip coordination needed? Yes   Intensity of Service   Intensity of Service >30 min     Updated MD that pt auth was approved. Awaiting response from [facility] if they can accept pt today. MD stated that pt is med ready for discharge today. Pt aware and raised concern about wife mentation. Wife not at bedside at this time. Called granddaughter, Halley. Halley states that pt wife is fine. Updated Halley of above. Halley thankful for the updates. Also, awaiting discharge orders to arrange transport.   Ember Peres RN, BSN, [place]/ [facility] Supervisor     2/15/25 1012     02/15/25 1011   Discharge Planning   What day is the transport expected? 02/15/25   What time is the transport expected? 1300     Updated Pt, pt granddaughter, and med team of 1300  to [facility]. Bedside RN has report number. Ambulance form on chart.   Ember Peres RN, BSN, [place]/ [facility] Supervisor

## 2025-07-15 ENCOUNTER — OFFICE VISIT (OUTPATIENT)
Dept: URGENT CARE | Age: OVER 89
End: 2025-07-15
Payer: MEDICARE

## 2025-07-15 VITALS
OXYGEN SATURATION: 91 % | SYSTOLIC BLOOD PRESSURE: 130 MMHG | HEART RATE: 85 BPM | DIASTOLIC BLOOD PRESSURE: 71 MMHG | TEMPERATURE: 98.1 F

## 2025-07-15 DIAGNOSIS — S51.812A FOREARM LACERATION, LEFT, INITIAL ENCOUNTER: Primary | ICD-10-CM

## 2025-07-15 PROCEDURE — 3075F SYST BP GE 130 - 139MM HG: CPT

## 2025-07-15 PROCEDURE — 3078F DIAST BP <80 MM HG: CPT

## 2025-07-15 PROCEDURE — 12002 RPR S/N/AX/GEN/TRNK2.6-7.5CM: CPT

## 2025-07-15 PROCEDURE — 99203 OFFICE O/P NEW LOW 30 MIN: CPT

## 2025-07-15 ASSESSMENT — ENCOUNTER SYMPTOMS
ARTHRALGIAS: 0
CHILLS: 0
HEADACHES: 0
FEVER: 0
MYALGIAS: 0
SHORTNESS OF BREATH: 0
DIARRHEA: 0
VOMITING: 0
COUGH: 0
NAUSEA: 0
JOINT SWELLING: 0
WOUND: 1

## 2025-07-15 NOTE — PATIENT INSTRUCTIONS
Do not get wet until tomorrow    Keep clean and dry    Keep covered with bandaid    Steri strip will come off on own, read handout    Monitor for signs of infection, redness, swelling etc

## 2025-07-15 NOTE — PROGRESS NOTES
Subjective   Patient ID: Luther Silverio is a 89 y.o. male. They present today with a chief complaint of Extremity Laceration (Left forearm - hit on a door about an hour ago , no longer bleeding /).    History of Present Illness  Patient presents for left forearm laceration sustained today. He explains that he just swiped his arm against a door frame. He has a history of thin laceration due to thin skin. Denies excessive bleeding. Denies numbness, tingling. Denies associated injury including head injury or fall. Is not on blood thinner. Denies history of diabetes. Denies history of skin infection from laceration. Denies fever, chills, sweats. Denies n/v/d. Denies chest pain, sob.             Past Medical History  Allergies as of 07/15/2025 - Reviewed 07/15/2025   Allergen Reaction Noted    Sulfamethoxazole-trimethoprim Rash 08/27/2024       Prescriptions Prior to Admission[1]     Medical History[2]    Surgical History[3]     reports that he has never smoked. He has never used smokeless tobacco. He reports that he does not drink alcohol.    Review of Systems  Review of Systems   Constitutional:  Negative for chills and fever.   Respiratory:  Negative for cough and shortness of breath.    Cardiovascular:  Negative for chest pain.   Gastrointestinal:  Negative for diarrhea, nausea and vomiting.   Musculoskeletal:  Negative for arthralgias, joint swelling and myalgias.   Skin:  Positive for wound. Negative for rash.   Neurological:  Negative for headaches.                                  Objective    Vitals:    07/15/25 1522   BP: 130/71   Pulse: 85   Temp: 36.7 °C (98.1 °F)   SpO2: 91%     No LMP for male patient.    Physical Exam  Constitutional:       General: He is not in acute distress.     Appearance: Normal appearance. He is not ill-appearing.   HENT:      Head: Normocephalic and atraumatic.   Eyes:      Extraocular Movements: Extraocular movements intact.      Pupils: Pupils are equal, round, and reactive  to light.   Pulmonary:      Effort: Pulmonary effort is normal.   Musculoskeletal:      Cervical back: Normal range of motion.   Skin:     Findings: Laceration and wound present.             Comments: Superficial well approximated laceration to left forearm.    Neurological:      Mental Status: He is alert.         Laceration Repair    Date/Time: 7/15/2025 4:10 PM    Performed by: Porsha Myrick PA-C  Authorized by: Porsha Myrick PA-C    Consent:     Consent obtained:  Verbal    Consent given by:  Patient    Risks discussed:  Infection and pain  Laceration details:     Location:  Shoulder/arm    Shoulder/arm location:  L lower arm    Length (cm):  4    Depth (mm):  1  Treatment:     Area cleansed with:  Soap and water and saline  Skin repair:     Repair method:  Steri-Strips    Number of Steri-Strips:  7  Approximation:     Approximation:  Close  Repair type:     Repair type:  Simple  Post-procedure details:     Dressing:  Non-adherent dressing    Procedure completion:  Tolerated well, no immediate complications      Point of Care Test & Imaging Results from this visit  No results found for this visit on 07/15/25.   Imaging  No results found.    Cardiology, Vascular, and Other Imaging  No other imaging results found for the past 2 days      Diagnostic study results (if any) were reviewed by Porsha Myrick PA-C.    Assessment/Plan   Allergies, medications, history, and pertinent labs/EKGs/Imaging reviewed by Porsha Myrick PA-C.     Medical Decision Making  MDM- Laceration in office able to be closed with skin steri strips - No evidence of retained FB as well as any vascular/nerve/tendon/osseous injury. Prophylactic antibiotics are unwarranted at this time. Pt is counseled on local wound care. Patient advised to return to clinic if any new signs or symptoms develop. Otherwise follow with PCP. Patient verbalized understanding and agree with plan.       Orders and Diagnoses  Diagnoses and all orders  for this visit:  Forearm laceration, left, initial encounter  -     Laceration Repair      Medical Admin Record      Patient disposition: Home    Electronically signed by Porsha Myrick PA-C  4:13 PM           [1] (Not in a hospital admission)   [2]   Past Medical History:  Diagnosis Date    Hypertension     Renal disorder    [3]   Past Surgical History:  Procedure Laterality Date    OTHER SURGICAL HISTORY  06/30/2020    Hernia repair    OTHER SURGICAL HISTORY  06/30/2020    Appendectomy